# Patient Record
Sex: FEMALE | Race: WHITE | NOT HISPANIC OR LATINO | Employment: UNEMPLOYED | URBAN - METROPOLITAN AREA
[De-identification: names, ages, dates, MRNs, and addresses within clinical notes are randomized per-mention and may not be internally consistent; named-entity substitution may affect disease eponyms.]

---

## 2017-08-10 PROBLEM — O35.03X0 CHOROID PLEXUS CYST OF FETUS AFFECTING MANAGEMENT OF MOTHER IN SINGLETON PREGNANCY, ANTEPARTUM: Status: ACTIVE | Noted: 2017-08-10

## 2017-09-26 PROBLEM — O26.899 ABDOMINAL PAIN AFFECTING PREGNANCY: Status: ACTIVE | Noted: 2017-09-26

## 2017-09-26 PROBLEM — R10.9 ABDOMINAL PAIN AFFECTING PREGNANCY: Status: ACTIVE | Noted: 2017-09-26

## 2018-10-23 ENCOUNTER — OFFICE VISIT (OUTPATIENT)
Dept: URGENT CARE | Facility: CLINIC | Age: 33
End: 2018-10-23
Payer: MEDICAID

## 2018-10-23 VITALS
WEIGHT: 172 LBS | HEIGHT: 65 IN | DIASTOLIC BLOOD PRESSURE: 94 MMHG | BODY MASS INDEX: 28.66 KG/M2 | HEART RATE: 105 BPM | SYSTOLIC BLOOD PRESSURE: 135 MMHG | TEMPERATURE: 98 F | RESPIRATION RATE: 18 BRPM | OXYGEN SATURATION: 95 %

## 2018-10-23 DIAGNOSIS — J32.9 SINUSITIS, UNSPECIFIED CHRONICITY, UNSPECIFIED LOCATION: ICD-10-CM

## 2018-10-23 DIAGNOSIS — R06.2 WHEEZING: ICD-10-CM

## 2018-10-23 DIAGNOSIS — J40 BRONCHITIS: Primary | ICD-10-CM

## 2018-10-23 PROCEDURE — 99214 OFFICE O/P EST MOD 30 MIN: CPT | Mod: S$GLB,,, | Performed by: EMERGENCY MEDICINE

## 2018-10-23 RX ORDER — ALBUTEROL SULFATE 90 UG/1
2 AEROSOL, METERED RESPIRATORY (INHALATION) EVERY 6 HOURS PRN
Qty: 18 G | Refills: 0 | Status: SHIPPED | OUTPATIENT
Start: 2018-10-23 | End: 2019-10-23

## 2018-10-23 RX ORDER — AZITHROMYCIN 250 MG/1
TABLET, FILM COATED ORAL
Qty: 6 TABLET | Refills: 0 | Status: SHIPPED | OUTPATIENT
Start: 2018-10-23 | End: 2022-03-28

## 2018-10-23 RX ORDER — DEXAMETHASONE SODIUM PHOSPHATE 4 MG/ML
8 INJECTION, SOLUTION INTRA-ARTICULAR; INTRALESIONAL; INTRAMUSCULAR; INTRAVENOUS; SOFT TISSUE ONCE
Status: COMPLETED | OUTPATIENT
Start: 2018-10-23 | End: 2018-10-23

## 2018-10-23 RX ORDER — PREDNISONE 20 MG/1
40 TABLET ORAL DAILY
Qty: 10 TABLET | Refills: 0 | Status: SHIPPED | OUTPATIENT
Start: 2018-10-23 | End: 2018-10-28

## 2018-10-23 RX ORDER — ALBUTEROL SULFATE 0.83 MG/ML
2.5 SOLUTION RESPIRATORY (INHALATION) EVERY 6 HOURS PRN
Qty: 1 BOX | Refills: 0 | Status: SHIPPED | OUTPATIENT
Start: 2018-10-23 | End: 2019-10-23

## 2018-10-23 RX ORDER — BENZONATATE 100 MG/1
100 CAPSULE ORAL 3 TIMES DAILY PRN
Qty: 30 CAPSULE | Refills: 1 | Status: SHIPPED | OUTPATIENT
Start: 2018-10-23 | End: 2019-10-23

## 2018-10-23 RX ADMIN — DEXAMETHASONE SODIUM PHOSPHATE 8 MG: 4 INJECTION, SOLUTION INTRA-ARTICULAR; INTRALESIONAL; INTRAMUSCULAR; INTRAVENOUS; SOFT TISSUE at 08:10

## 2018-10-24 NOTE — PATIENT INSTRUCTIONS
Bronchitis with Wheezing (Viral or Bacterial: Adult)    Bronchitis is an infection of the air passages. It often occurs during a cold and is usually caused by a virus. Symptoms include cough with mucus (phlegm) and low-grade fever. This illness is contagious during the first few days and is spread through the air by coughing and sneezing, or by direct contact (touching the sick person and then touching your own eyes, nose, or mouth).  If there is a lot of inflammation, air flow is restricted. The air passages may also go into spasm, especially if you have asthma. This causes wheezing and difficulty breathing even in people who do not have asthma.  Bronchitis usually lasts 7 to 14 days. The wheezing should improve with treatment during the first week. An inhaler is often prescribed to relax the air passages and stop wheezing. Antibiotics will be prescribed if your doctor thinks there is also a secondary bacterial infection.  Home care  · If symptoms are severe, rest at home for the first 2 to 3 days. When you go back to your usual activities, don't let yourself get too tired.  · Do not smoke. Also avoid being exposed to secondhand smoke.  · You may use over-the-counter medicine to control fever or pain, unless another medicine was prescribed. Note: If you have chronic liver or kidney disease or have ever had a stomach ulcer or gastrointestinal bleeding, talk with your healthcare provider before using these medicines. Also talk to your provider if you are taking medicine to prevent blood clots.) Aspirin should never be given to anyone younger than 18 years of age who is ill with a viral infection or fever. It may cause severe liver or brain damage.  · Your appetite may be poor, so a light diet is fine. Avoid dehydration by drinking 6 to 8 glasses of fluids per day (such as water, soft drinks, sports drinks, juices, tea, or soup). Extra fluids will help loosen secretions in the nose and lungs.  · Over-the-counter  cough, cold, and sore-throat medicines will not shorten the length of the illness, but they may be helpful to reduce symptoms. (Note: Do not use decongestants if you have high blood pressure.)  · If you were given an inhaler, use it exactly as directed. If you need to use it more often than prescribed, your condition may be worsening. If this happens, contact your healthcare provider.  · If prescribed, finish all antibiotic medicine, even if you are feeling better after only a few days.  Follow-up care  Follow up with your healthcare provider, or as advised. If you had an X-ray or ECG (electrocardiogram), a specialist will review it. You will be notified of any new findings that may affect your care.  Note: If you are age 65 or older, or if you have a chronic lung disease or condition that affects your immune system, or you smoke, talk to your healthcare provider about having a pneumococcal vaccinations and a yearly influenza vaccination (flu shot).  When to seek medical advice  Call your healthcare provider right away if any of these occur:  · Fever of 100.4°F (38°C) or higher  · Coughing up increasing amounts of colored sputum  · Weakness, drowsiness, headache, facial pain, ear pain, or a stiff neck  Call 911, or get immediate medical care  Contact emergency services right away if any of these occur.  · Coughing up blood  · Worsening weakness, drowsiness, headache, or stiff neck  · Increased wheezing not helped with medication, shortness of breath, or pain with breathing  Date Last Reviewed: 9/13/2015  © 4903-6930 [employer]. [address] [place] [address], [place] [address]. All rights reserved. This information is not intended as a substitute for professional medical care. Always follow your healthcare professional's instructions.

## 2018-10-24 NOTE — PROGRESS NOTES
"Subjective:       Patient ID: Bhavna Heart is a 33 y.o. female.    Vitals:  height is 5' 5" (1.651 m) and weight is 78 kg (172 lb). Her oral temperature is 98 °F (36.7 °C). Her blood pressure is 135/94 (abnormal) and her pulse is 105. Her respiration is 18 and oxygen saturation is 95%.     Chief Complaint: Cough    Pt presents with productive cough with green sputum production and nasal congestion x 2 days. She denies f/c/n/v. She is having int wheezing and has done several resp tx at home for relief.       Cough   This is a new problem. The current episode started yesterday. Associated symptoms include wheezing. Pertinent negatives include no chest pain, chills, ear pain, eye redness, fever, headaches, myalgias, sore throat or shortness of breath. Treatments tried: albuterol neb. The treatment provided mild relief.     Review of Systems   Constitution: Negative for chills, fever and malaise/fatigue.   HENT: Positive for congestion. Negative for ear pain, hoarse voice and sore throat.    Eyes: Negative for discharge and redness.   Cardiovascular: Negative for chest pain, dyspnea on exertion and leg swelling.   Respiratory: Positive for cough and wheezing. Negative for shortness of breath and sputum production.    Musculoskeletal: Negative for myalgias.   Gastrointestinal: Negative for abdominal pain and nausea.   Neurological: Negative for headaches.       Objective:      Physical Exam   Constitutional: She is oriented to person, place, and time. She appears well-developed and well-nourished. She is cooperative.  Non-toxic appearance. She does not appear ill. No distress.   HENT:   Head: Normocephalic and atraumatic.   Right Ear: Hearing, tympanic membrane, external ear and ear canal normal.   Left Ear: Hearing, tympanic membrane, external ear and ear canal normal.   Nose: Nose normal. No mucosal edema, rhinorrhea or nasal deformity. No epistaxis. Right sinus exhibits no maxillary sinus tenderness and " no frontal sinus tenderness. Left sinus exhibits no maxillary sinus tenderness and no frontal sinus tenderness.   Mouth/Throat: Uvula is midline and mucous membranes are normal. No trismus in the jaw. Normal dentition. No uvula swelling. Posterior oropharyngeal erythema present.   Eyes: Conjunctivae and lids are normal. No scleral icterus.   Sclera clear bilat   Neck: Trachea normal, full passive range of motion without pain and phonation normal. Neck supple.   Cardiovascular: Normal rate, regular rhythm, normal heart sounds, intact distal pulses and normal pulses.   Pulmonary/Chest: Effort normal. No respiratory distress. She has wheezes.   Faint scattered exp wheezing   Abdominal: Soft. Normal appearance and bowel sounds are normal. She exhibits no distension. There is no tenderness.   Musculoskeletal: Normal range of motion. She exhibits no edema or deformity.   Neurological: She is alert and oriented to person, place, and time. She exhibits normal muscle tone. Coordination normal.   Skin: Skin is warm, dry and intact. She is not diaphoretic. No pallor.   Psychiatric: She has a normal mood and affect. Her speech is normal and behavior is normal. Judgment and thought content normal. Cognition and memory are normal.   Nursing note and vitals reviewed.      Assessment:       1. Bronchitis    2. Wheezing    3. Sinusitis, unspecified chronicity, unspecified location        Plan:         Bronchitis    Wheezing    Sinusitis, unspecified chronicity, unspecified location    Other orders  -     dexamethasone injection 8 mg  -     azithromycin (Z-KARLA) 250 MG tablet; Take 2 tablets by mouth on day 1; Take 1 tablet by mouth on days 2-5  Dispense: 6 tablet; Refill: 0  -     predniSONE (DELTASONE) 20 MG tablet; Take 2 tablets (40 mg total) by mouth once daily. for 5 days  Dispense: 10 tablet; Refill: 0  -     albuterol (PROVENTIL) 2.5 mg /3 mL (0.083 %) nebulizer solution; Take 3 mLs (2.5 mg total) by nebulization every 6 (six)  hours as needed for Wheezing. Rescue  Dispense: 1 Box; Refill: 0  -     albuterol (PROVENTIL HFA) 90 mcg/actuation inhaler; Inhale 2 puffs into the lungs every 6 (six) hours as needed for Wheezing. Rescue  Dispense: 18 g; Refill: 0  -     benzonatate (TESSALON PERLES) 100 MG capsule; Take 1 capsule (100 mg total) by mouth 3 (three) times daily as needed for Cough.  Dispense: 30 capsule; Refill: 1

## 2019-05-31 ENCOUNTER — CLINICAL SUPPORT (OUTPATIENT)
Dept: URGENT CARE | Facility: CLINIC | Age: 34
End: 2019-05-31
Payer: MEDICAID

## 2019-05-31 VITALS
SYSTOLIC BLOOD PRESSURE: 118 MMHG | HEART RATE: 81 BPM | BODY MASS INDEX: 29.4 KG/M2 | TEMPERATURE: 99 F | WEIGHT: 172.19 LBS | HEIGHT: 64 IN | DIASTOLIC BLOOD PRESSURE: 84 MMHG | OXYGEN SATURATION: 97 % | RESPIRATION RATE: 16 BRPM

## 2019-05-31 DIAGNOSIS — H66.001 ACUTE SUPPURATIVE OTITIS MEDIA OF RIGHT EAR WITHOUT SPONTANEOUS RUPTURE OF TYMPANIC MEMBRANE, RECURRENCE NOT SPECIFIED: Primary | ICD-10-CM

## 2019-05-31 PROCEDURE — 99214 OFFICE O/P EST MOD 30 MIN: CPT | Mod: S$GLB,,, | Performed by: NURSE PRACTITIONER

## 2019-05-31 PROCEDURE — 99214 PR OFFICE/OUTPT VISIT, EST, LEVL IV, 30-39 MIN: ICD-10-PCS | Mod: S$GLB,,, | Performed by: NURSE PRACTITIONER

## 2019-05-31 RX ORDER — AMOXICILLIN AND CLAVULANATE POTASSIUM 875; 125 MG/1; MG/1
1 TABLET, FILM COATED ORAL 2 TIMES DAILY
Qty: 20 TABLET | Refills: 0 | Status: SHIPPED | OUTPATIENT
Start: 2019-05-31 | End: 2019-06-10

## 2019-05-31 RX ORDER — BUPROPION HYDROCHLORIDE 150 MG/1
150 TABLET, EXTENDED RELEASE ORAL 2 TIMES DAILY
COMMUNITY
End: 2022-03-28

## 2019-05-31 NOTE — PATIENT INSTRUCTIONS
Middle Ear Infection (Adult)  You have an infection of the middle ear, the space behind the eardrum. This is also called acute otitis media (AOM). Sometimes it is caused by the common cold. This is because congestion can block the internal passage (eustachian tube) that drains fluid from the middle ear. When the middle ear fills with fluid, bacteria can grow there and cause an infection. Oral antibiotics are used to treat this illness, not ear drops. Symptoms usually start to improve within 1 to 2 days of treatment.    Home care  The following are general care guidelines:  · Finish all of the antibiotic medicine given, even though you may feel better after the first few days.  · You may use over-the-counter medicine, such as acetaminophen or ibuprofen, to control pain and fever, unless something else was prescribed. If you have chronic liver or kidney disease or have ever had a stomach ulcer or gastrointestinal bleeding, talk with your healthcare provider before using these medicines. Do not give aspirin to anyone under 18 years of age who has a fever. It may cause severe illness or death.  Follow-up care  Follow up with your healthcare provider, or as advised, in 2 weeks if all symptoms have not gotten better, or if hearing doesn't go back to normal within 1 month.  When to seek medical advice  Call your healthcare provider right away if any of these occur:  · Ear pain gets worse or does not improve after 3 days of treatment  · Unusual drowsiness or confusion  · Neck pain, stiff neck, or headache  · Fluid or blood draining from the ear canal  · Fever of 100.4°F (38°C) or as advised   · Seizure  Date Last Reviewed: 6/1/2016  © 1464-0422 [employer]. [address] [place] [address], [place] [address]. All rights reserved. This information is not intended as a substitute for professional medical care. Always follow your healthcare professional's instructions.

## 2019-05-31 NOTE — PROGRESS NOTES
"Subjective:       Patient ID: Bhavna Heart is a 34 y.o. female.    Vitals:  height is 5' 4" (1.626 m) and weight is 78.1 kg (172 lb 3.2 oz). Her temperature is 99 °F (37.2 °C). Her blood pressure is 118/84 and her pulse is 81. Her respiration is 16 and oxygen saturation is 97%.     Chief Complaint: Sinus Problem    Sinus Problem   This is a new problem. The current episode started in the past 7 days. Associated symptoms include congestion, ear pain, a hoarse voice, sinus pressure and a sore throat. Pertinent negatives include no chills, coughing, headaches or shortness of breath. (Ear aches  )       Constitution: Negative for chills, fatigue and fever.   HENT: Positive for ear pain, congestion, sinus pressure and sore throat.    Neck: Negative for painful lymph nodes.   Cardiovascular: Negative for chest pain and leg swelling.   Eyes: Negative for double vision and blurred vision.   Respiratory: Negative for cough and shortness of breath.    Gastrointestinal: Negative for nausea, vomiting and diarrhea.   Genitourinary: Negative for dysuria, frequency, urgency and history of kidney stones.   Musculoskeletal: Negative for joint pain, joint swelling, muscle cramps and muscle ache.   Skin: Negative for color change, pale, rash and bruising.   Allergic/Immunologic: Negative for seasonal allergies.   Neurological: Negative for dizziness, history of vertigo, light-headedness, passing out and headaches.   Hematologic/Lymphatic: Negative for swollen lymph nodes.   Psychiatric/Behavioral: Negative for nervous/anxious, sleep disturbance and depression. The patient is not nervous/anxious.        Objective:      Physical Exam   Constitutional: She is oriented to person, place, and time. Vital signs are normal. She appears well-developed and well-nourished. She is cooperative.   HENT:   Head: Normocephalic.   Right Ear: Hearing, external ear and ear canal normal. Tympanic membrane is erythematous and bulging. A " middle ear effusion is present.   Left Ear: Hearing, external ear and ear canal normal. A middle ear effusion is present.   Nose: Mucosal edema and rhinorrhea present. Right sinus exhibits maxillary sinus tenderness. Left sinus exhibits maxillary sinus tenderness.   Mouth/Throat: Uvula is midline and mucous membranes are normal. Posterior oropharyngeal erythema present.   Eyes: Pupils are equal, round, and reactive to light. Conjunctivae, EOM and lids are normal.   Neck: Trachea normal, normal range of motion, full passive range of motion without pain and phonation normal. Neck supple.   Cardiovascular: Normal rate, regular rhythm, normal heart sounds, intact distal pulses and normal pulses.   Pulmonary/Chest: Effort normal and breath sounds normal.   Abdominal: Soft. Normal appearance, normal aorta and bowel sounds are normal. There is no tenderness.   Musculoskeletal: Normal range of motion.   Neurological: She is alert and oriented to person, place, and time. She has normal strength. GCS eye subscore is 4. GCS verbal subscore is 5. GCS motor subscore is 6.   Skin: Skin is warm, dry and intact. Capillary refill takes less than 2 seconds.   Psychiatric: She has a normal mood and affect. Her speech is normal and behavior is normal. Judgment and thought content normal. Cognition and memory are normal.       Assessment:       1. Acute suppurative otitis media of right ear without spontaneous rupture of tympanic membrane, recurrence not specified        Plan:         Acute suppurative otitis media of right ear without spontaneous rupture of tympanic membrane, recurrence not specified    Other orders  -     amoxicillin-clavulanate 875-125mg (AUGMENTIN) 875-125 mg per tablet; Take 1 tablet by mouth 2 (two) times daily. for 10 days  Dispense: 20 tablet; Refill: 0

## 2021-01-04 ENCOUNTER — OFFICE VISIT (OUTPATIENT)
Dept: URGENT CARE | Facility: CLINIC | Age: 36
End: 2021-01-04
Payer: MEDICAID

## 2021-01-04 VITALS
DIASTOLIC BLOOD PRESSURE: 93 MMHG | OXYGEN SATURATION: 93 % | HEIGHT: 65 IN | RESPIRATION RATE: 24 BRPM | WEIGHT: 189.38 LBS | SYSTOLIC BLOOD PRESSURE: 134 MMHG | HEART RATE: 101 BPM | BODY MASS INDEX: 31.55 KG/M2 | TEMPERATURE: 98 F

## 2021-01-04 DIAGNOSIS — J40 BRONCHITIS: Primary | ICD-10-CM

## 2021-01-04 DIAGNOSIS — R06.2 WHEEZING: ICD-10-CM

## 2021-01-04 LAB
CTP QC/QA: YES
SARS-COV-2 RDRP RESP QL NAA+PROBE: NEGATIVE

## 2021-01-04 PROCEDURE — 99214 PR OFFICE/OUTPT VISIT, EST, LEVL IV, 30-39 MIN: ICD-10-PCS | Mod: 25,S$GLB,, | Performed by: NURSE PRACTITIONER

## 2021-01-04 PROCEDURE — 87635 PR SARS-COV-2 COVID-19 AMPLIFIED PROBE: ICD-10-PCS | Mod: QW,S$GLB,, | Performed by: NURSE PRACTITIONER

## 2021-01-04 PROCEDURE — 94640 PR INHAL RX, AIRWAY OBST/DX SPUTUM INDUCT: ICD-10-PCS | Mod: S$GLB,,, | Performed by: NURSE PRACTITIONER

## 2021-01-04 PROCEDURE — 99214 OFFICE O/P EST MOD 30 MIN: CPT | Mod: 25,S$GLB,, | Performed by: NURSE PRACTITIONER

## 2021-01-04 PROCEDURE — 94640 AIRWAY INHALATION TREATMENT: CPT | Mod: S$GLB,,, | Performed by: NURSE PRACTITIONER

## 2021-01-04 PROCEDURE — 87635 SARS-COV-2 COVID-19 AMP PRB: CPT | Mod: QW,S$GLB,, | Performed by: NURSE PRACTITIONER

## 2021-01-04 RX ORDER — ALBUTEROL SULFATE 0.83 MG/ML
2.5 SOLUTION RESPIRATORY (INHALATION)
Status: COMPLETED | OUTPATIENT
Start: 2021-01-04 | End: 2021-01-04

## 2021-01-04 RX ORDER — ALBUTEROL SULFATE 90 UG/1
2 AEROSOL, METERED RESPIRATORY (INHALATION) EVERY 6 HOURS PRN
Qty: 18 G | Refills: 0 | Status: SHIPPED | OUTPATIENT
Start: 2021-01-04 | End: 2022-01-04

## 2021-01-04 RX ORDER — ALBUTEROL SULFATE 0.83 MG/ML
2.5 SOLUTION RESPIRATORY (INHALATION) EVERY 6 HOURS PRN
Qty: 1 BOX | Refills: 0 | Status: SHIPPED | OUTPATIENT
Start: 2021-01-04 | End: 2022-03-28 | Stop reason: SDUPTHER

## 2021-01-04 RX ORDER — DEXAMETHASONE SODIUM PHOSPHATE 4 MG/ML
8 INJECTION, SOLUTION INTRA-ARTICULAR; INTRALESIONAL; INTRAMUSCULAR; INTRAVENOUS; SOFT TISSUE
Status: COMPLETED | OUTPATIENT
Start: 2021-01-04 | End: 2021-01-04

## 2021-01-04 RX ORDER — PREDNISONE 20 MG/1
20 TABLET ORAL 2 TIMES DAILY
Qty: 10 TABLET | Refills: 0 | Status: SHIPPED | OUTPATIENT
Start: 2021-01-04 | End: 2021-01-09

## 2021-01-04 RX ORDER — IPRATROPIUM BROMIDE 0.5 MG/2.5ML
0.5 SOLUTION RESPIRATORY (INHALATION)
Status: COMPLETED | OUTPATIENT
Start: 2021-01-04 | End: 2021-01-04

## 2021-01-04 RX ADMIN — DEXAMETHASONE SODIUM PHOSPHATE 8 MG: 4 INJECTION, SOLUTION INTRA-ARTICULAR; INTRALESIONAL; INTRAMUSCULAR; INTRAVENOUS; SOFT TISSUE at 03:01

## 2021-01-04 RX ADMIN — ALBUTEROL SULFATE 2.5 MG: 0.83 SOLUTION RESPIRATORY (INHALATION) at 03:01

## 2021-01-04 RX ADMIN — IPRATROPIUM BROMIDE 0.5 MG: 0.5 SOLUTION RESPIRATORY (INHALATION) at 03:01

## 2022-03-28 ENCOUNTER — OFFICE VISIT (OUTPATIENT)
Dept: FAMILY MEDICINE | Facility: CLINIC | Age: 37
End: 2022-03-28
Payer: MEDICAID

## 2022-03-28 VITALS
WEIGHT: 179 LBS | TEMPERATURE: 99 F | DIASTOLIC BLOOD PRESSURE: 84 MMHG | HEIGHT: 65 IN | HEART RATE: 82 BPM | SYSTOLIC BLOOD PRESSURE: 128 MMHG | BODY MASS INDEX: 29.82 KG/M2 | OXYGEN SATURATION: 98 %

## 2022-03-28 DIAGNOSIS — Z00.00 ANNUAL PHYSICAL EXAM: Primary | ICD-10-CM

## 2022-03-28 DIAGNOSIS — Z01.419 ENCOUNTER FOR WELL WOMAN EXAM WITH ROUTINE GYNECOLOGICAL EXAM: ICD-10-CM

## 2022-03-28 DIAGNOSIS — J30.1 ALLERGIC RHINITIS DUE TO POLLEN, UNSPECIFIED SEASONALITY: Primary | ICD-10-CM

## 2022-03-28 DIAGNOSIS — E66.09 CLASS 1 OBESITY DUE TO EXCESS CALORIES WITH SERIOUS COMORBIDITY AND BODY MASS INDEX (BMI) OF 30.0 TO 30.9 IN ADULT: ICD-10-CM

## 2022-03-28 DIAGNOSIS — Z13.1 DIABETES MELLITUS SCREENING: ICD-10-CM

## 2022-03-28 DIAGNOSIS — Z13.29 THYROID DISORDER SCREEN: ICD-10-CM

## 2022-03-28 DIAGNOSIS — J45.20 MILD INTERMITTENT REACTIVE AIRWAY DISEASE WITHOUT COMPLICATION: ICD-10-CM

## 2022-03-28 DIAGNOSIS — Z11.59 NEED FOR HEPATITIS C SCREENING TEST: ICD-10-CM

## 2022-03-28 DIAGNOSIS — Z13.220 LIPID SCREENING: ICD-10-CM

## 2022-03-28 DIAGNOSIS — Z11.4 ENCOUNTER FOR SCREENING FOR HIV: ICD-10-CM

## 2022-03-28 PROCEDURE — 3079F DIAST BP 80-89 MM HG: CPT | Mod: ,,, | Performed by: NURSE PRACTITIONER

## 2022-03-28 PROCEDURE — 1160F PR REVIEW ALL MEDS BY PRESCRIBER/CLIN PHARMACIST DOCUMENTED: ICD-10-PCS | Mod: ,,, | Performed by: NURSE PRACTITIONER

## 2022-03-28 PROCEDURE — 99385 PREV VISIT NEW AGE 18-39: CPT | Mod: S$PBB,,, | Performed by: NURSE PRACTITIONER

## 2022-03-28 PROCEDURE — 3008F PR BODY MASS INDEX (BMI) DOCUMENTED: ICD-10-PCS | Mod: ,,, | Performed by: NURSE PRACTITIONER

## 2022-03-28 PROCEDURE — 3074F PR MOST RECENT SYSTOLIC BLOOD PRESSURE < 130 MM HG: ICD-10-PCS | Mod: ,,, | Performed by: NURSE PRACTITIONER

## 2022-03-28 PROCEDURE — 99205 OFFICE O/P NEW HI 60 MIN: CPT | Performed by: NURSE PRACTITIONER

## 2022-03-28 PROCEDURE — 1160F RVW MEDS BY RX/DR IN RCRD: CPT | Mod: ,,, | Performed by: NURSE PRACTITIONER

## 2022-03-28 PROCEDURE — 3074F SYST BP LT 130 MM HG: CPT | Mod: ,,, | Performed by: NURSE PRACTITIONER

## 2022-03-28 PROCEDURE — 99385 PR PREVENTIVE VISIT,NEW,18-39: ICD-10-PCS | Mod: S$PBB,,, | Performed by: NURSE PRACTITIONER

## 2022-03-28 PROCEDURE — 3079F PR MOST RECENT DIASTOLIC BLOOD PRESSURE 80-89 MM HG: ICD-10-PCS | Mod: ,,, | Performed by: NURSE PRACTITIONER

## 2022-03-28 PROCEDURE — 3008F BODY MASS INDEX DOCD: CPT | Mod: ,,, | Performed by: NURSE PRACTITIONER

## 2022-03-28 RX ORDER — FEXOFENADINE HCL AND PSEUDOEPHEDRINE HCI 180; 240 MG/1; MG/1
1 TABLET, EXTENDED RELEASE ORAL DAILY
COMMUNITY
End: 2023-05-23

## 2022-03-28 RX ORDER — ALBUTEROL SULFATE 90 UG/1
2 AEROSOL, METERED RESPIRATORY (INHALATION) EVERY 6 HOURS PRN
Qty: 18 G | Refills: 1 | Status: SHIPPED | OUTPATIENT
Start: 2022-03-28 | End: 2022-05-19 | Stop reason: SDUPTHER

## 2022-03-28 RX ORDER — ALBUTEROL SULFATE 0.83 MG/ML
2.5 SOLUTION RESPIRATORY (INHALATION) EVERY 6 HOURS PRN
Qty: 1 EACH | Refills: 2 | Status: SHIPPED | OUTPATIENT
Start: 2022-03-28 | End: 2022-05-19 | Stop reason: SDUPTHER

## 2022-03-28 RX ORDER — FLUTICASONE PROPIONATE 50 MCG
1 SPRAY, SUSPENSION (ML) NASAL 2 TIMES DAILY
Qty: 16 G | Refills: 2 | Status: SHIPPED | OUTPATIENT
Start: 2022-03-28 | End: 2022-08-25

## 2022-03-28 RX ORDER — AZELASTINE 1 MG/ML
1 SPRAY, METERED NASAL 2 TIMES DAILY
Qty: 30 ML | Refills: 2 | Status: SHIPPED | OUTPATIENT
Start: 2022-03-28 | End: 2022-07-07

## 2022-03-28 NOTE — PROGRESS NOTES
HPI: Bhavna Heart  is a 36 y.o. female who presents for annual physical .  No complaints at this time.     Review of Systems   Constitutional: Negative for activity change, appetite change and fever.   HENT: Negative for congestion, ear discharge, ear pain, sore throat, trouble swallowing and voice change.    Eyes: Negative for photophobia, pain, discharge and visual disturbance.   Respiratory: Negative for cough, chest tightness and shortness of breath.    Cardiovascular: Negative for chest pain and palpitations.   Gastrointestinal: Negative for abdominal pain, nausea and vomiting.   Endocrine: Negative for cold intolerance and heat intolerance.   Genitourinary: Negative for difficulty urinating and dysuria.   Musculoskeletal: Negative for arthralgias and gait problem.   Skin: Negative for rash.   Allergic/Immunologic: Negative for immunocompromised state.   Neurological: Negative for speech difficulty and headaches.   Psychiatric/Behavioral: Negative for confusion, self-injury and suicidal ideas.     Review of patient's allergies indicates:   Allergen Reactions    Doxycycline Nausea Only     History reviewed. No pertinent past medical history.  History reviewed. No pertinent surgical history.  Family History   Problem Relation Age of Onset    No Known Problems Mother     Asthma Father     Asthma Maternal Aunt     Cancer Paternal Uncle     Diabetes Maternal Grandmother     Thyroid disease Maternal Grandmother     Cancer Maternal Grandfather     Kidney disease Maternal Grandfather     Heart disease Paternal Grandfather     Stroke Paternal Grandfather      Social History     Tobacco Use    Smoking status: Former Smoker     Packs/day: 0.50     Quit date: 2022     Years since quittin.1    Smokeless tobacco: Never Used   Substance Use Topics    Alcohol use: No    Drug use: No      The patient has no Health Maintenance topics of status Not Due    There is no immunization history  on file for this patient.  OBJECTIVE:      Vitals:    03/28/22 0934   BP: 128/84   Pulse: 82   Temp: 99.4 °F (37.4 °C)     Physical Exam  Vitals and nursing note reviewed.   Constitutional:       General: She is not in acute distress.     Appearance: Normal appearance. She is well-developed. She is obese.   HENT:      Head: Normocephalic and atraumatic.      Right Ear: Tympanic membrane, ear canal and external ear normal.      Left Ear: Tympanic membrane, ear canal and external ear normal.      Nose: Nose normal.      Mouth/Throat:      Mouth: Mucous membranes are moist.   Eyes:      General: Lids are normal. Lids are everted, no foreign bodies appreciated.      Conjunctiva/sclera: Conjunctivae normal.      Pupils: Pupils are equal, round, and reactive to light.      Right eye: Pupil is round and reactive.      Left eye: Pupil is round and reactive.   Neck:      Trachea: Trachea normal.   Cardiovascular:      Rate and Rhythm: Normal rate and regular rhythm.      Pulses: Normal pulses.      Heart sounds: Normal heart sounds, S1 normal and S2 normal.   Pulmonary:      Effort: Pulmonary effort is normal.      Breath sounds: Normal breath sounds.   Abdominal:      General: Abdomen is flat. Bowel sounds are normal.      Palpations: Abdomen is soft. Abdomen is not rigid.      Tenderness: There is no guarding.   Musculoskeletal:         General: Normal range of motion.      Cervical back: Normal range of motion and neck supple. No muscular tenderness.   Lymphadenopathy:      Cervical: No cervical adenopathy.   Skin:     General: Skin is warm and dry.      Capillary Refill: Capillary refill takes less than 2 seconds.   Neurological:      General: No focal deficit present.      Mental Status: She is alert and oriented to person, place, and time. Mental status is at baseline.   Psychiatric:         Mood and Affect: Mood normal.         Behavior: Behavior normal. Behavior is cooperative.         Thought Content: Thought  content normal.         Judgment: Judgment normal.        Assessment:       1. Annual physical exam    2. Encounter for well woman exam with routine gynecological exam    3. Diabetes mellitus screening    4. Encounter for screening for HIV    5. Need for hepatitis C screening test    6. Thyroid disorder screen    7. Lipid screening    8. Class 1 obesity due to excess calories with serious comorbidity and body mass index (BMI) of 30.0 to 30.9 in adult        Plan:       Annual physical exam  Completed.    Encounter for well woman exam with routine gynecological exam  -     Cancel: Ambulatory referral/consult to Obstetrics / Gynecology; Future; Expected date: 04/04/2022  -     Ambulatory referral/consult to Obstetrics / Gynecology; Future; Expected date: 04/04/2022    Diabetes mellitus screening  -     Comprehensive Metabolic Panel; Future; Expected date: 03/28/2022    Encounter for screening for HIV  -     HIV 1/2 Ag/Ab (4th Gen); Future; Expected date: 03/28/2022    Need for hepatitis C screening test  -     Hepatitis C Antibody; Future; Expected date: 03/28/2022    Thyroid disorder screen  -     TSH; Future; Expected date: 03/28/2022    Lipid screening  -     Lipid Panel; Future; Expected date: 03/28/2022    Class 1 obesity due to excess calories with serious comorbidity and body mass index (BMI) of 30.0 to 30.9 in adult  The patient is asked to make an attempt to improve diet and exercise patterns to aid in medical management of this problem.        Patient counseled on age appropriate medical preventative services, age appropriate cancer screenings, nutrition, healthy diet, consistent exercise regimen and maintaining an active lifestyle.      Counseled on age appropriate vaccines and discussed upcoming health care needs based on age/gender.  Spent time with patient counseling on need for a good patient/doctor relationship moving forward.  Discussed use of common OTC medications and supplements.  Discussed common  dietary aids and use of caffeine and the need for good sleep hygiene and stress management.    Follow up in about 4 weeks (around 4/25/2022) for wheezing, lab review, allergies.      3/28/2022 KOFI Ba, FNP-C

## 2022-05-19 ENCOUNTER — OFFICE VISIT (OUTPATIENT)
Dept: FAMILY MEDICINE | Facility: CLINIC | Age: 37
End: 2022-05-19
Payer: MEDICAID

## 2022-05-19 VITALS
OXYGEN SATURATION: 97 % | SYSTOLIC BLOOD PRESSURE: 130 MMHG | WEIGHT: 184 LBS | HEIGHT: 65 IN | BODY MASS INDEX: 30.66 KG/M2 | DIASTOLIC BLOOD PRESSURE: 80 MMHG | TEMPERATURE: 99 F | HEART RATE: 97 BPM

## 2022-05-19 DIAGNOSIS — J45.20 MILD INTERMITTENT REACTIVE AIRWAY DISEASE WITHOUT COMPLICATION: Primary | ICD-10-CM

## 2022-05-19 DIAGNOSIS — J06.9 UPPER RESPIRATORY TRACT INFECTION, UNSPECIFIED TYPE: ICD-10-CM

## 2022-05-19 PROCEDURE — 3075F SYST BP GE 130 - 139MM HG: CPT | Mod: CPTII,,, | Performed by: NURSE PRACTITIONER

## 2022-05-19 PROCEDURE — 3008F BODY MASS INDEX DOCD: CPT | Mod: CPTII,,, | Performed by: NURSE PRACTITIONER

## 2022-05-19 PROCEDURE — 99213 OFFICE O/P EST LOW 20 MIN: CPT | Mod: S$PBB,,, | Performed by: NURSE PRACTITIONER

## 2022-05-19 PROCEDURE — 1159F PR MEDICATION LIST DOCUMENTED IN MEDICAL RECORD: ICD-10-PCS | Mod: CPTII,,, | Performed by: NURSE PRACTITIONER

## 2022-05-19 PROCEDURE — 3075F PR MOST RECENT SYSTOLIC BLOOD PRESS GE 130-139MM HG: ICD-10-PCS | Mod: CPTII,,, | Performed by: NURSE PRACTITIONER

## 2022-05-19 PROCEDURE — 99214 OFFICE O/P EST MOD 30 MIN: CPT | Performed by: NURSE PRACTITIONER

## 2022-05-19 PROCEDURE — 99213 PR OFFICE/OUTPT VISIT, EST, LEVL III, 20-29 MIN: ICD-10-PCS | Mod: S$PBB,,, | Performed by: NURSE PRACTITIONER

## 2022-05-19 PROCEDURE — 1160F PR REVIEW ALL MEDS BY PRESCRIBER/CLIN PHARMACIST DOCUMENTED: ICD-10-PCS | Mod: CPTII,,, | Performed by: NURSE PRACTITIONER

## 2022-05-19 PROCEDURE — 1160F RVW MEDS BY RX/DR IN RCRD: CPT | Mod: CPTII,,, | Performed by: NURSE PRACTITIONER

## 2022-05-19 PROCEDURE — 3079F PR MOST RECENT DIASTOLIC BLOOD PRESSURE 80-89 MM HG: ICD-10-PCS | Mod: CPTII,,, | Performed by: NURSE PRACTITIONER

## 2022-05-19 PROCEDURE — 3079F DIAST BP 80-89 MM HG: CPT | Mod: CPTII,,, | Performed by: NURSE PRACTITIONER

## 2022-05-19 PROCEDURE — 3008F PR BODY MASS INDEX (BMI) DOCUMENTED: ICD-10-PCS | Mod: CPTII,,, | Performed by: NURSE PRACTITIONER

## 2022-05-19 PROCEDURE — 1159F MED LIST DOCD IN RCRD: CPT | Mod: CPTII,,, | Performed by: NURSE PRACTITIONER

## 2022-05-19 RX ORDER — FLUTICASONE PROPIONATE AND SALMETEROL 100; 50 UG/1; UG/1
1 POWDER RESPIRATORY (INHALATION) 2 TIMES DAILY
Qty: 60 EACH | Refills: 2 | Status: SHIPPED | OUTPATIENT
Start: 2022-05-19 | End: 2022-06-21

## 2022-05-19 RX ORDER — AZITHROMYCIN 250 MG/1
TABLET, FILM COATED ORAL
Qty: 6 TABLET | Refills: 0 | Status: SHIPPED | OUTPATIENT
Start: 2022-05-19 | End: 2022-05-24

## 2022-05-19 RX ORDER — ALBUTEROL SULFATE 90 UG/1
2 AEROSOL, METERED RESPIRATORY (INHALATION) EVERY 6 HOURS PRN
Qty: 18 G | Refills: 1 | Status: SHIPPED | OUTPATIENT
Start: 2022-05-19 | End: 2022-10-28 | Stop reason: SDUPTHER

## 2022-05-19 RX ORDER — ALBUTEROL SULFATE 0.83 MG/ML
2.5 SOLUTION RESPIRATORY (INHALATION) EVERY 6 HOURS PRN
Qty: 1 EACH | Refills: 2 | Status: SHIPPED | OUTPATIENT
Start: 2022-05-19 | End: 2022-10-28 | Stop reason: SDUPTHER

## 2022-05-19 NOTE — PROGRESS NOTES
Subjective:       Patient ID: Bhavna Heart is a 37 y.o. female.    Chief Complaint: Wheezing, Cough, and Shortness of Breath    Cough  This is a new problem. The current episode started 1 to 4 weeks ago. The problem has been gradually worsening. The problem occurs every few minutes. The cough is productive of sputum. Associated symptoms include ear congestion, ear pain, postnasal drip, shortness of breath and wheezing. Pertinent negatives include no chest pain, fever, headaches, nasal congestion, rash, rhinorrhea, sore throat or weight loss. The symptoms are aggravated by lying down and exercise. She has tried rest, body position changes and a beta-agonist inhaler for the symptoms. The treatment provided mild relief. Her past medical history is significant for asthma and environmental allergies.   Wheezing   This is a new problem. The current episode started in the past 7 days. The problem occurs 2 to 4 times per day. The problem has been gradually worsening. Associated symptoms include coughing, ear pain, shortness of breath and sputum production. Pertinent negatives include no abdominal pain, chest pain, fever, headaches, rash, rhinorrhea, sore throat or vomiting. The symptoms are aggravated by URIs, fumes and any activity. She has tried rest and beta agonist inhalers for the symptoms. The treatment provided moderate relief. Her past medical history is significant for asthma. There is no history of chronic lung disease.     Review of Systems   Constitutional: Negative for activity change, appetite change, fever and weight loss.        Obese   HENT: Positive for ear pain and postnasal drip. Negative for congestion, ear discharge, rhinorrhea, sinus pressure, sore throat, trouble swallowing and voice change.    Eyes: Negative for photophobia, pain, discharge and visual disturbance.   Respiratory: Positive for cough, sputum production, shortness of breath and wheezing. Negative for chest tightness.     Cardiovascular: Negative for chest pain and palpitations.   Gastrointestinal: Negative for abdominal pain, nausea and vomiting.   Endocrine: Negative for cold intolerance and heat intolerance.   Genitourinary: Negative for difficulty urinating and dysuria.   Musculoskeletal: Negative for arthralgias and gait problem.   Skin: Negative for rash.   Allergic/Immunologic: Positive for environmental allergies. Negative for immunocompromised state.   Neurological: Negative for speech difficulty and headaches.   Psychiatric/Behavioral: Negative for confusion, self-injury and suicidal ideas.     History reviewed. No pertinent past medical history. History reviewed. No pertinent surgical history.    Family History   Problem Relation Age of Onset    No Known Problems Mother     Asthma Father     Asthma Maternal Aunt     Cancer Paternal Uncle     Diabetes Maternal Grandmother     Thyroid disease Maternal Grandmother     Cancer Maternal Grandfather     Kidney disease Maternal Grandfather     Heart disease Paternal Grandfather     Stroke Paternal Grandfather        Social History     Socioeconomic History    Marital status: Single   Tobacco Use    Smoking status: Former Smoker     Packs/day: 0.50     Quit date: 2022     Years since quittin.3    Smokeless tobacco: Never Used   Substance and Sexual Activity    Alcohol use: No    Drug use: No    Sexual activity: Yes     Partners: Male       Current Outpatient Medications   Medication Sig Dispense Refill    azelastine (ASTELIN) 137 mcg (0.1 %) nasal spray 1 spray (137 mcg total) by Nasal route 2 (two) times daily. 30 mL 2    fexofenadine-pseudoephedrine (ALLEGRA-D 24) 180-240 mg per 24 hr tablet Take 1 tablet by mouth once daily.      fluticasone propionate (FLONASE) 50 mcg/actuation nasal spray 1 spray (50 mcg total) by Each Nostril route 2 (two) times daily. 16 g 2    albuterol (PROVENTIL) 2.5 mg /3 mL (0.083 %) nebulizer solution Take 3 mLs (2.5 mg  "total) by nebulization every 6 (six) hours as needed for Wheezing. Rescue 1 each 2    albuterol (PROVENTIL/VENTOLIN HFA) 90 mcg/actuation inhaler Inhale 2 puffs into the lungs every 6 (six) hours as needed for Wheezing. 18 g 1    azithromycin (Z-KARLA) 250 MG tablet Take 2 tablets by mouth on day 1; Take 1 tablet by mouth on days 2-5 6 tablet 0    fluticasone-salmeterol diskus inhaler 100-50 mcg Inhale 1 puff into the lungs 2 (two) times daily. Controller 60 each 2     No current facility-administered medications for this visit.       Review of patient's allergies indicates:   Allergen Reactions    Doxycycline Nausea Only     Objective:      Blood pressure 130/80, pulse 97, temperature 98.6 °F (37 °C), height 5' 4.5" (1.638 m), weight 83.5 kg (184 lb), last menstrual period 04/26/2022, SpO2 97 %, unknown if currently breastfeeding. Body mass index is 31.1 kg/m².   Physical Exam  Vitals and nursing note reviewed.   Constitutional:       General: She is not in acute distress.     Appearance: Normal appearance. She is well-developed. She is obese. She is ill-appearing.   HENT:      Head: Normocephalic and atraumatic.      Right Ear: External ear normal. A middle ear effusion is present. Tympanic membrane is erythematous.      Left Ear: External ear normal. A middle ear effusion is present. Tympanic membrane is not erythematous.      Nose: Mucosal edema present.      Mouth/Throat:      Mouth: Mucous membranes are moist.      Pharynx: Uvula midline. Oropharyngeal exudate (clear pnd) present. No pharyngeal swelling or posterior oropharyngeal erythema.   Eyes:      General: Lids are normal. Lids are everted, no foreign bodies appreciated.      Conjunctiva/sclera: Conjunctivae normal.      Pupils: Pupils are equal, round, and reactive to light.      Right eye: Pupil is round and reactive.      Left eye: Pupil is round and reactive.   Neck:      Trachea: Trachea normal.   Cardiovascular:      Rate and Rhythm: Normal rate " and regular rhythm.      Pulses: Normal pulses.      Heart sounds: Normal heart sounds, S1 normal and S2 normal.   Pulmonary:      Effort: Pulmonary effort is normal. No respiratory distress.      Breath sounds: Examination of the right-upper field reveals decreased breath sounds. Examination of the left-upper field reveals decreased breath sounds. Examination of the right-middle field reveals decreased breath sounds. Examination of the left-middle field reveals decreased breath sounds. Examination of the right-lower field reveals decreased breath sounds. Examination of the left-lower field reveals decreased breath sounds. Decreased breath sounds and wheezing present. No rhonchi.      Comments: Exp wheezing noted  Chest:      Chest wall: No tenderness.   Abdominal:      General: Abdomen is flat. Bowel sounds are normal.      Palpations: Abdomen is soft. Abdomen is not rigid.      Tenderness: There is no guarding.   Musculoskeletal:         General: Normal range of motion.      Cervical back: Normal range of motion and neck supple. No muscular tenderness.   Lymphadenopathy:      Cervical: No cervical adenopathy.   Skin:     General: Skin is warm and dry.      Capillary Refill: Capillary refill takes less than 2 seconds.   Neurological:      General: No focal deficit present.      Mental Status: She is alert and oriented to person, place, and time.   Psychiatric:         Mood and Affect: Mood normal.         Behavior: Behavior normal. Behavior is cooperative.         Thought Content: Thought content normal.         Judgment: Judgment normal.             Assessment:       1. Mild intermittent reactive airway disease without complication    2. Upper respiratory tract infection, unspecified type        Plan:       Bhavna was seen today for wheezing, cough and shortness of breath.    Diagnoses and all orders for this visit:    Mild intermittent reactive airway disease without complication  -     fluticasone-salmeterol  diskus inhaler 100-50 mcg; Inhale 1 puff into the lungs 2 (two) times daily. Controller  -     albuterol (PROVENTIL/VENTOLIN HFA) 90 mcg/actuation inhaler; Inhale 2 puffs into the lungs every 6 (six) hours as needed for Wheezing.  -     albuterol (PROVENTIL) 2.5 mg /3 mL (0.083 %) nebulizer solution; Take 3 mLs (2.5 mg total) by nebulization every 6 (six) hours as needed for Wheezing. Rescue    Upper respiratory tract infection, unspecified type  -     azithromycin (Z-KARLA) 250 MG tablet; Take 2 tablets by mouth on day 1; Take 1 tablet by mouth on days 2-5

## 2022-06-16 LAB
ALBUMIN SERPL-MCNC: 4.3 G/DL (ref 3.8–4.8)
ALBUMIN/GLOB SERPL: 2 {RATIO} (ref 1.2–2.2)
ALP SERPL-CCNC: 61 IU/L (ref 44–121)
ALT SERPL-CCNC: 13 IU/L (ref 0–32)
AST SERPL-CCNC: 16 IU/L (ref 0–40)
BILIRUB SERPL-MCNC: 0.6 MG/DL (ref 0–1.2)
BUN SERPL-MCNC: 7 MG/DL (ref 6–20)
BUN/CREAT SERPL: 11 (ref 9–23)
CALCIUM SERPL-MCNC: 8.8 MG/DL (ref 8.7–10.2)
CHLORIDE SERPL-SCNC: 105 MMOL/L (ref 96–106)
CHOLEST SERPL-MCNC: 179 MG/DL (ref 100–199)
CO2 SERPL-SCNC: 24 MMOL/L (ref 20–29)
CREAT SERPL-MCNC: 0.63 MG/DL (ref 0.57–1)
EST. GFR  (NO RACE VARIABLE): 119 ML/MIN/1.73
GLOBULIN SER CALC-MCNC: 2.2 G/DL (ref 1.5–4.5)
GLUCOSE SERPL-MCNC: 86 MG/DL (ref 65–99)
HCV AB S/CO SERPL IA: <0.1 S/CO RATIO (ref 0–0.9)
HDLC SERPL-MCNC: 43 MG/DL
HIV 1+2 AB+HIV1 P24 AG SERPL QL IA: NON REACTIVE
LDLC SERPL CALC-MCNC: 126 MG/DL (ref 0–99)
POTASSIUM SERPL-SCNC: 4.6 MMOL/L (ref 3.5–5.2)
PROT SERPL-MCNC: 6.5 G/DL (ref 6–8.5)
SODIUM SERPL-SCNC: 139 MMOL/L (ref 134–144)
TRIGL SERPL-MCNC: 50 MG/DL (ref 0–149)
TSH SERPL DL<=0.005 MIU/L-ACNC: 1.79 UIU/ML (ref 0.45–4.5)
VLDLC SERPL CALC-MCNC: 10 MG/DL (ref 5–40)

## 2022-06-21 ENCOUNTER — OFFICE VISIT (OUTPATIENT)
Dept: FAMILY MEDICINE | Facility: CLINIC | Age: 37
End: 2022-06-21
Payer: MEDICAID

## 2022-06-21 VITALS
TEMPERATURE: 99 F | BODY MASS INDEX: 31.16 KG/M2 | SYSTOLIC BLOOD PRESSURE: 120 MMHG | OXYGEN SATURATION: 98 % | WEIGHT: 187 LBS | DIASTOLIC BLOOD PRESSURE: 78 MMHG | HEIGHT: 65 IN | HEART RATE: 96 BPM

## 2022-06-21 DIAGNOSIS — J45.20 MILD INTERMITTENT REACTIVE AIRWAY DISEASE WITHOUT COMPLICATION: ICD-10-CM

## 2022-06-21 DIAGNOSIS — E78.5 MILD HYPERLIPIDEMIA: Primary | ICD-10-CM

## 2022-06-21 DIAGNOSIS — E66.09 CLASS 1 OBESITY DUE TO EXCESS CALORIES WITH SERIOUS COMORBIDITY AND BODY MASS INDEX (BMI) OF 31.0 TO 31.9 IN ADULT: ICD-10-CM

## 2022-06-21 PROCEDURE — 3074F PR MOST RECENT SYSTOLIC BLOOD PRESSURE < 130 MM HG: ICD-10-PCS | Mod: CPTII,,, | Performed by: NURSE PRACTITIONER

## 2022-06-21 PROCEDURE — 3074F SYST BP LT 130 MM HG: CPT | Mod: CPTII,,, | Performed by: NURSE PRACTITIONER

## 2022-06-21 PROCEDURE — 1160F RVW MEDS BY RX/DR IN RCRD: CPT | Mod: CPTII,,, | Performed by: NURSE PRACTITIONER

## 2022-06-21 PROCEDURE — 1159F MED LIST DOCD IN RCRD: CPT | Mod: CPTII,,, | Performed by: NURSE PRACTITIONER

## 2022-06-21 PROCEDURE — 99214 OFFICE O/P EST MOD 30 MIN: CPT | Performed by: NURSE PRACTITIONER

## 2022-06-21 PROCEDURE — 1159F PR MEDICATION LIST DOCUMENTED IN MEDICAL RECORD: ICD-10-PCS | Mod: CPTII,,, | Performed by: NURSE PRACTITIONER

## 2022-06-21 PROCEDURE — 3078F DIAST BP <80 MM HG: CPT | Mod: CPTII,,, | Performed by: NURSE PRACTITIONER

## 2022-06-21 PROCEDURE — 1160F PR REVIEW ALL MEDS BY PRESCRIBER/CLIN PHARMACIST DOCUMENTED: ICD-10-PCS | Mod: CPTII,,, | Performed by: NURSE PRACTITIONER

## 2022-06-21 PROCEDURE — 3008F PR BODY MASS INDEX (BMI) DOCUMENTED: ICD-10-PCS | Mod: CPTII,,, | Performed by: NURSE PRACTITIONER

## 2022-06-21 PROCEDURE — 99213 OFFICE O/P EST LOW 20 MIN: CPT | Mod: S$PBB,,, | Performed by: NURSE PRACTITIONER

## 2022-06-21 PROCEDURE — 3008F BODY MASS INDEX DOCD: CPT | Mod: CPTII,,, | Performed by: NURSE PRACTITIONER

## 2022-06-21 PROCEDURE — 99213 PR OFFICE/OUTPT VISIT, EST, LEVL III, 20-29 MIN: ICD-10-PCS | Mod: S$PBB,,, | Performed by: NURSE PRACTITIONER

## 2022-06-21 PROCEDURE — 3078F PR MOST RECENT DIASTOLIC BLOOD PRESSURE < 80 MM HG: ICD-10-PCS | Mod: CPTII,,, | Performed by: NURSE PRACTITIONER

## 2022-06-21 RX ORDER — FEXOFENADINE HCL 60 MG
60 TABLET ORAL DAILY
COMMUNITY
End: 2023-05-23

## 2022-06-21 RX ORDER — FLUTICASONE PROPIONATE AND SALMETEROL 250; 50 UG/1; UG/1
1 POWDER RESPIRATORY (INHALATION) 2 TIMES DAILY
Qty: 60 EACH | Refills: 5 | Status: SHIPPED | OUTPATIENT
Start: 2022-06-21 | End: 2022-10-28 | Stop reason: SDUPTHER

## 2022-06-21 NOTE — PROGRESS NOTES
Subjective:       Patient ID: Bhavna Heart is a 37 y.o. female.    Chief Complaint: Allergies and Follow-up    Hyperlipidemia  This is a chronic problem. The current episode started more than 1 month ago. The problem is controlled. Recent lipid tests were reviewed and are high. Exacerbating diseases include obesity. She has no history of hypothyroidism. Factors aggravating her hyperlipidemia include fatty foods. Pertinent negatives include no chest pain, focal weakness, leg pain or shortness of breath. She is currently on no antihyperlipidemic treatment. The current treatment provides moderate improvement of lipids. Compliance problems include adherence to exercise and adherence to diet.  Risk factors for coronary artery disease include stress and dyslipidemia.   Asthma  There is no chest tightness, cough, frequent throat clearing, hemoptysis or shortness of breath. This is a chronic problem. The current episode started more than 1 month ago. Asthma causes daytime symptoms weekly. Asthma causes nighttime symptoms weekly. The problem has been waxing and waning. Associated symptoms include dyspnea on exertion. Pertinent negatives include no appetite change, chest pain, ear pain, fever, headaches, nasal congestion, postnasal drip, sore throat, trouble swallowing or weight loss. Her symptoms are aggravated by emotional stress, change in weather, URI, pollen and strenuous activity. Her symptoms are alleviated by rest, beta-agonist and steroid inhaler. She reports moderate improvement on treatment. Her symptoms are not alleviated by rest and change in position. Her past medical history is significant for asthma. There is no history of bronchitis.     Review of Systems   Constitutional: Negative for activity change, appetite change, fever and weight loss.        Obesity   HENT: Negative for congestion, ear discharge, ear pain, postnasal drip, sore throat, trouble swallowing and voice change.    Eyes: Negative  for photophobia, pain, discharge and visual disturbance.   Respiratory: Negative for cough, hemoptysis, chest tightness and shortness of breath.         Asthma   Cardiovascular: Positive for dyspnea on exertion. Negative for chest pain and palpitations.        Hyperlipidemia   Gastrointestinal: Negative for abdominal pain, nausea and vomiting.   Endocrine: Negative for cold intolerance and heat intolerance.   Genitourinary: Negative for difficulty urinating and dysuria.   Musculoskeletal: Negative for arthralgias and gait problem.   Skin: Negative for rash.   Allergic/Immunologic: Negative for immunocompromised state.   Neurological: Negative for focal weakness, speech difficulty and headaches.   Psychiatric/Behavioral: Negative for confusion, self-injury and suicidal ideas.     History reviewed. No pertinent past medical history. History reviewed. No pertinent surgical history.    Family History   Problem Relation Age of Onset    No Known Problems Mother     Asthma Father     Asthma Maternal Aunt     Cancer Paternal Uncle     Diabetes Maternal Grandmother     Thyroid disease Maternal Grandmother     Cancer Maternal Grandfather     Kidney disease Maternal Grandfather     Heart disease Paternal Grandfather     Stroke Paternal Grandfather        Social History     Socioeconomic History    Marital status: Single   Tobacco Use    Smoking status: Former Smoker     Packs/day: 0.50     Quit date: 2022     Years since quittin.3    Smokeless tobacco: Never Used   Substance and Sexual Activity    Alcohol use: No    Drug use: No    Sexual activity: Yes     Partners: Male       Current Outpatient Medications   Medication Sig Dispense Refill    albuterol (PROVENTIL) 2.5 mg /3 mL (0.083 %) nebulizer solution Take 3 mLs (2.5 mg total) by nebulization every 6 (six) hours as needed for Wheezing. Rescue 1 each 2    albuterol (PROVENTIL/VENTOLIN HFA) 90 mcg/actuation inhaler Inhale 2 puffs into the lungs  "every 6 (six) hours as needed for Wheezing. 18 g 1    azelastine (ASTELIN) 137 mcg (0.1 %) nasal spray 1 spray (137 mcg total) by Nasal route 2 (two) times daily. 30 mL 2    fexofenadine (ALLEGRA) 60 MG tablet Take 60 mg by mouth once daily.      fluticasone propionate (FLONASE) 50 mcg/actuation nasal spray 1 spray (50 mcg total) by Each Nostril route 2 (two) times daily. 16 g 2    fexofenadine-pseudoephedrine (ALLEGRA-D 24) 180-240 mg per 24 hr tablet Take 1 tablet by mouth once daily.      fluticasone-salmeterol diskus inhaler 250-50 mcg Inhale 1 puff into the lungs 2 (two) times daily. Controller 60 each 5     No current facility-administered medications for this visit.       Review of patient's allergies indicates:   Allergen Reactions    Doxycycline Nausea Only     Objective:      Blood pressure 120/78, pulse 96, temperature 98.8 °F (37.1 °C), height 5' 4.5" (1.638 m), weight 84.8 kg (187 lb), last menstrual period 06/17/2022, SpO2 98 %, unknown if currently breastfeeding. Body mass index is 31.6 kg/m².   Physical Exam  Vitals and nursing note reviewed.   Constitutional:       General: She is not in acute distress.     Appearance: Normal appearance. She is well-developed.   HENT:      Head: Normocephalic and atraumatic.      Right Ear: Tympanic membrane, ear canal and external ear normal.      Left Ear: Tympanic membrane, ear canal and external ear normal.      Nose: Nose normal.      Mouth/Throat:      Mouth: Mucous membranes are moist.      Pharynx: Oropharynx is clear.   Eyes:      General: Lids are normal. Lids are everted, no foreign bodies appreciated.      Conjunctiva/sclera: Conjunctivae normal.      Pupils: Pupils are equal, round, and reactive to light.      Right eye: Pupil is round and reactive.      Left eye: Pupil is round and reactive.   Neck:      Trachea: Trachea normal.   Cardiovascular:      Rate and Rhythm: Normal rate and regular rhythm.      Pulses: Normal pulses.      Heart sounds: " Normal heart sounds, S1 normal and S2 normal. No murmur heard.  Pulmonary:      Effort: Pulmonary effort is normal. No respiratory distress.      Breath sounds: Normal breath sounds. No wheezing.   Abdominal:      General: Abdomen is flat. Bowel sounds are normal.      Palpations: Abdomen is soft. Abdomen is not rigid.      Tenderness: There is no guarding.   Musculoskeletal:         General: Normal range of motion.      Cervical back: Normal range of motion and neck supple. No muscular tenderness.   Lymphadenopathy:      Cervical: No cervical adenopathy.   Skin:     General: Skin is warm and dry.      Capillary Refill: Capillary refill takes less than 2 seconds.   Neurological:      General: No focal deficit present.      Mental Status: She is alert and oriented to person, place, and time.   Psychiatric:         Mood and Affect: Mood normal.         Behavior: Behavior normal. Behavior is cooperative.         Thought Content: Thought content normal.         Judgment: Judgment normal.             Assessment:       1. Mild hyperlipidemia    2. Mild intermittent reactive airway disease without complication    3. Class 1 obesity due to excess calories with serious comorbidity and body mass index (BMI) of 31.0 to 31.9 in adult        Plan:       Bhavna was seen today for allergies and follow-up.    Diagnoses and all orders for this visit:    Mild hyperlipidemia  -     Lipid Panel; Future  -     Lipid Panel    Limit red meat, butter, fried foods, cheese, and other foods that have a lot of saturated fat. Consume more: lean meats, fish, fruits, vegetables, whole grains, beans, lentils, and nuts.  Weight loss, and 30-45 min of cardiovascular exercise daily.     Mild intermittent reactive airway disease without complication  -     fluticasone-salmeterol diskus inhaler 250-50 mcg; Inhale 1 puff into the lungs 2 (two) times daily. Controller    Class 1 obesity due to excess calories with serious comorbidity and body mass index  (BMI) of 31.0 to 31.9 in adult  The patient is asked to make an attempt to improve diet and exercise patterns to aid in medical management of this problem.

## 2022-12-10 LAB
CHOLEST SERPL-MCNC: 175 MG/DL (ref 100–199)
HDLC SERPL-MCNC: 44 MG/DL
LDLC SERPL CALC-MCNC: 121 MG/DL (ref 0–99)
TRIGL SERPL-MCNC: 52 MG/DL (ref 0–149)
VLDLC SERPL CALC-MCNC: 10 MG/DL (ref 5–40)

## 2022-12-12 ENCOUNTER — OFFICE VISIT (OUTPATIENT)
Dept: FAMILY MEDICINE | Facility: CLINIC | Age: 37
End: 2022-12-12
Payer: MEDICAID

## 2022-12-12 VITALS
DIASTOLIC BLOOD PRESSURE: 80 MMHG | WEIGHT: 185 LBS | HEART RATE: 86 BPM | OXYGEN SATURATION: 98 % | BODY MASS INDEX: 30.82 KG/M2 | HEIGHT: 65 IN | SYSTOLIC BLOOD PRESSURE: 120 MMHG | TEMPERATURE: 98 F

## 2022-12-12 DIAGNOSIS — J30.1 ALLERGIC RHINITIS DUE TO POLLEN, UNSPECIFIED SEASONALITY: ICD-10-CM

## 2022-12-12 DIAGNOSIS — J45.20 MILD INTERMITTENT REACTIVE AIRWAY DISEASE WITHOUT COMPLICATION: ICD-10-CM

## 2022-12-12 DIAGNOSIS — M25.551 BILATERAL HIP PAIN: ICD-10-CM

## 2022-12-12 DIAGNOSIS — M25.552 BILATERAL HIP PAIN: ICD-10-CM

## 2022-12-12 DIAGNOSIS — E78.5 MILD HYPERLIPIDEMIA: Primary | ICD-10-CM

## 2022-12-12 DIAGNOSIS — J03.90 TONSILLITIS: ICD-10-CM

## 2022-12-12 DIAGNOSIS — E66.09 CLASS 1 OBESITY DUE TO EXCESS CALORIES WITH SERIOUS COMORBIDITY AND BODY MASS INDEX (BMI) OF 31.0 TO 31.9 IN ADULT: ICD-10-CM

## 2022-12-12 PROCEDURE — 3008F BODY MASS INDEX DOCD: CPT | Mod: CPTII,,, | Performed by: NURSE PRACTITIONER

## 2022-12-12 PROCEDURE — 3079F PR MOST RECENT DIASTOLIC BLOOD PRESSURE 80-89 MM HG: ICD-10-PCS | Mod: CPTII,,, | Performed by: NURSE PRACTITIONER

## 2022-12-12 PROCEDURE — 3074F SYST BP LT 130 MM HG: CPT | Mod: CPTII,,, | Performed by: NURSE PRACTITIONER

## 2022-12-12 PROCEDURE — 99215 OFFICE O/P EST HI 40 MIN: CPT | Performed by: NURSE PRACTITIONER

## 2022-12-12 PROCEDURE — 1159F MED LIST DOCD IN RCRD: CPT | Mod: CPTII,,, | Performed by: NURSE PRACTITIONER

## 2022-12-12 PROCEDURE — 3079F DIAST BP 80-89 MM HG: CPT | Mod: CPTII,,, | Performed by: NURSE PRACTITIONER

## 2022-12-12 PROCEDURE — 1160F RVW MEDS BY RX/DR IN RCRD: CPT | Mod: CPTII,,, | Performed by: NURSE PRACTITIONER

## 2022-12-12 PROCEDURE — 3074F PR MOST RECENT SYSTOLIC BLOOD PRESSURE < 130 MM HG: ICD-10-PCS | Mod: CPTII,,, | Performed by: NURSE PRACTITIONER

## 2022-12-12 PROCEDURE — 1159F PR MEDICATION LIST DOCUMENTED IN MEDICAL RECORD: ICD-10-PCS | Mod: CPTII,,, | Performed by: NURSE PRACTITIONER

## 2022-12-12 PROCEDURE — 99214 PR OFFICE/OUTPT VISIT, EST, LEVL IV, 30-39 MIN: ICD-10-PCS | Mod: S$PBB,,, | Performed by: NURSE PRACTITIONER

## 2022-12-12 PROCEDURE — 1160F PR REVIEW ALL MEDS BY PRESCRIBER/CLIN PHARMACIST DOCUMENTED: ICD-10-PCS | Mod: CPTII,,, | Performed by: NURSE PRACTITIONER

## 2022-12-12 PROCEDURE — 99214 OFFICE O/P EST MOD 30 MIN: CPT | Mod: S$PBB,,, | Performed by: NURSE PRACTITIONER

## 2022-12-12 PROCEDURE — 3008F PR BODY MASS INDEX (BMI) DOCUMENTED: ICD-10-PCS | Mod: CPTII,,, | Performed by: NURSE PRACTITIONER

## 2022-12-12 RX ORDER — AMOXICILLIN 875 MG/1
875 TABLET, FILM COATED ORAL EVERY 12 HOURS
Qty: 20 TABLET | Refills: 0 | Status: SHIPPED | OUTPATIENT
Start: 2022-12-12 | End: 2023-05-23

## 2022-12-12 NOTE — PROGRESS NOTES
Subjective:       Patient ID: Bhavna Heart is a 37 y.o. female.    Chief Complaint: Asthma, Hyperlipidemia, and Follow-up    Hyperlipidemia  This is a chronic problem. The current episode started more than 1 month ago. The problem is controlled. Recent lipid tests were reviewed and are variable. Exacerbating diseases include obesity. She has no history of hypothyroidism. Factors aggravating her hyperlipidemia include fatty foods. Pertinent negatives include no chest pain, focal weakness, leg pain or shortness of breath. Current antihyperlipidemic treatment includes diet change. The current treatment provides mild improvement of lipids. Compliance problems include adherence to exercise and adherence to diet.  Risk factors for coronary artery disease include stress and dyslipidemia.   Asthma  She complains of hoarse voice. There is no chest tightness, cough, frequent throat clearing, hemoptysis or shortness of breath. This is a chronic problem. The current episode started more than 1 month ago. Asthma causes daytime symptoms weekly. Asthma causes nighttime symptoms weekly. The problem has been waxing and waning. Associated symptoms include dyspnea on exertion and a sore throat. Pertinent negatives include no appetite change, chest pain, ear pain, fever, headaches, nasal congestion, postnasal drip, trouble swallowing or weight loss. Her symptoms are aggravated by emotional stress, change in weather, URI, pollen and strenuous activity. Her symptoms are alleviated by rest, beta-agonist and steroid inhaler. She reports moderate improvement on treatment. Her symptoms are not alleviated by rest and change in position. Her past medical history is significant for asthma. There is no history of bronchitis.   Follow-up  This is a chronic problem. The current episode started more than 1 month ago. The problem occurs daily. The problem has been waxing and waning. Associated symptoms include a sore throat. Pertinent  negatives include no abdominal pain, arthralgias, chest pain, congestion, coughing, fever, headaches, nausea, numbness, rash or vomiting. The symptoms are aggravated by exertion, walking and stress. She has tried rest, position changes and NSAIDs for the symptoms. The treatment provided moderate relief.   Hip Pain   The incident occurred more than 1 week ago. There was no injury mechanism (increased physical activity). The quality of the pain is described as aching. The pain is moderate. The pain has been Fluctuating since onset. Associated symptoms include muscle weakness. Pertinent negatives include no inability to bear weight, loss of motion, loss of sensation, numbness or tingling. She reports no foreign bodies present. The symptoms are aggravated by movement and weight bearing. She has tried rest and NSAIDs for the symptoms. The treatment provided moderate relief.   Sore Throat   This is a new problem. The current episode started 1 to 4 weeks ago. The problem has been waxing and waning. There has been no fever. The pain is moderate. Associated symptoms include a hoarse voice. Pertinent negatives include no abdominal pain, congestion, coughing, ear discharge, ear pain, headaches, shortness of breath, trouble swallowing or vomiting. She has had no exposure to strep or mono. She has tried cool liquids and NSAIDs for the symptoms. The treatment provided mild relief.   Review of Systems   Constitutional:  Negative for activity change, appetite change, fever and weight loss.        Obesity   HENT:  Positive for hoarse voice and sore throat. Negative for congestion, ear discharge, ear pain, postnasal drip, trouble swallowing and voice change.    Eyes:  Negative for photophobia, pain, discharge and visual disturbance.   Respiratory:  Negative for cough, hemoptysis, chest tightness and shortness of breath.         Asthma   Cardiovascular:  Positive for dyspnea on exertion. Negative for chest pain and palpitations.         Hyperlipidemia   Gastrointestinal:  Negative for abdominal pain, nausea and vomiting.   Endocrine: Negative for cold intolerance and heat intolerance.   Genitourinary:  Negative for difficulty urinating and dysuria.   Musculoskeletal:  Negative for arthralgias and gait problem.   Skin:  Negative for rash.   Allergic/Immunologic: Negative for immunocompromised state.   Neurological:  Negative for tingling, focal weakness, speech difficulty, numbness and headaches.   Psychiatric/Behavioral:  Negative for confusion, self-injury and suicidal ideas.    History reviewed. No pertinent past medical history. History reviewed. No pertinent surgical history.    Family History   Problem Relation Age of Onset    No Known Problems Mother     Asthma Father     Asthma Maternal Aunt     Cancer Paternal Uncle     Diabetes Maternal Grandmother     Thyroid disease Maternal Grandmother     Cancer Maternal Grandfather     Kidney disease Maternal Grandfather     Heart disease Paternal Grandfather     Stroke Paternal Grandfather        Social History     Socioeconomic History    Marital status: Single   Tobacco Use    Smoking status: Former     Packs/day: 0.50     Types: Cigarettes     Quit date: 2022     Years since quittin.8    Smokeless tobacco: Never   Substance and Sexual Activity    Alcohol use: No    Drug use: No    Sexual activity: Yes     Partners: Male       Current Outpatient Medications   Medication Sig Dispense Refill    albuterol (PROVENTIL) 2.5 mg /3 mL (0.083 %) nebulizer solution Take 3 mLs (2.5 mg total) by nebulization every 6 (six) hours as needed for Wheezing. Rescue 1 each 2    albuterol (PROVENTIL/VENTOLIN HFA) 90 mcg/actuation inhaler Inhale 2 puffs into the lungs every 6 (six) hours as needed for Wheezing. 18 g 1    azelastine (ASTELIN) 137 mcg (0.1 %) nasal spray SPRAY 1 SPRAY BY NASAL ROUTE 2 TIMES DAILY. 30 mL 5    fluticasone propionate (FLONASE) 50 mcg/actuation nasal spray SPRAY 1 SPRAY INTO EACH  "NOSTRIL TWICE A DAY 16 mL 2    fluticasone-salmeterol diskus inhaler 250-50 mcg Inhale 1 puff into the lungs 2 (two) times daily. Controller 60 each 5    amoxicillin (AMOXIL) 875 MG tablet Take 1 tablet (875 mg total) by mouth every 12 (twelve) hours. 20 tablet 0    fexofenadine (ALLEGRA) 60 MG tablet Take 60 mg by mouth once daily.      fexofenadine-pseudoephedrine (ALLEGRA-D 24) 180-240 mg per 24 hr tablet Take 1 tablet by mouth once daily.       No current facility-administered medications for this visit.       Review of patient's allergies indicates:   Allergen Reactions    Doxycycline Nausea Only     Objective:      Blood pressure 120/80, pulse 86, temperature 97.5 °F (36.4 °C), height 5' 4.5" (1.638 m), weight 83.9 kg (185 lb), last menstrual period 11/28/2022, SpO2 98 %, unknown if currently breastfeeding. Body mass index is 31.26 kg/m².   Physical Exam  Vitals and nursing note reviewed.   Constitutional:       General: She is not in acute distress.     Appearance: Normal appearance. She is well-developed.   HENT:      Head: Normocephalic and atraumatic.      Right Ear: Tympanic membrane, ear canal and external ear normal.      Left Ear: Tympanic membrane, ear canal and external ear normal.      Nose: Nose normal.      Mouth/Throat:      Mouth: Mucous membranes are moist.      Pharynx: Oropharyngeal exudate and posterior oropharyngeal erythema present.   Eyes:      General: Lids are normal. Lids are everted, no foreign bodies appreciated.      Conjunctiva/sclera: Conjunctivae normal.      Pupils: Pupils are equal, round, and reactive to light.      Right eye: Pupil is round and reactive.      Left eye: Pupil is round and reactive.   Neck:      Trachea: Trachea normal.   Cardiovascular:      Rate and Rhythm: Normal rate and regular rhythm.      Pulses: Normal pulses.      Heart sounds: Normal heart sounds, S1 normal and S2 normal. No murmur heard.  Pulmonary:      Effort: Pulmonary effort is normal. No " respiratory distress.      Breath sounds: Normal breath sounds. No wheezing.   Abdominal:      General: Abdomen is flat. Bowel sounds are normal.      Palpations: Abdomen is soft. Abdomen is not rigid.      Tenderness: There is no guarding.   Musculoskeletal:         General: Normal range of motion.      Cervical back: Normal range of motion and neck supple. No muscular tenderness.   Lymphadenopathy:      Cervical: No cervical adenopathy.   Skin:     General: Skin is warm and dry.      Capillary Refill: Capillary refill takes less than 2 seconds.   Neurological:      General: No focal deficit present.      Mental Status: She is alert and oriented to person, place, and time.   Psychiatric:         Mood and Affect: Mood normal.         Behavior: Behavior normal. Behavior is cooperative.         Thought Content: Thought content normal.         Judgment: Judgment normal.           Assessment:       1. Mild hyperlipidemia    2. Mild intermittent reactive airway disease without complication    3. Tonsillitis    4. Bilateral hip pain    5. Allergic rhinitis due to pollen, unspecified seasonality    6. Class 1 obesity due to excess calories with serious comorbidity and body mass index (BMI) of 31.0 to 31.9 in adult          Plan:       Bhavna was seen today for asthma, hyperlipidemia and follow-up.    Diagnoses and all orders for this visit:    Mild hyperlipidemia  -     Lipid Panel; Future    Increase exercise.  Limit red meat, butter, fried foods, cheese, and other foods that have a lot of saturated fat. Consume more: lean meats, fish, fruits, vegetables, whole grains, beans, lentils, and nuts.  Weight loss, and 30-45 min of cardiovascular exercise daily.      Mild intermittent reactive airway disease without complication  Stable on current inhalers. Using prn.     Tonsillitis  -     amoxicillin (AMOXIL) 875 MG tablet; Take 1 tablet (875 mg total) by mouth every 12 (twelve) hours.    Bilateral hip pain  -     Ambulatory  referral/consult to Physical/Occupational Therapy; Future    Allergic rhinitis due to pollen, unspecified seasonality  Flonase as prescribed    Class 1 obesity due to excess calories with serious comorbidity and body mass index (BMI) of 31.0 to 31.9 in adult  The patient is asked to make an attempt to improve diet and exercise patterns to aid in medical management of this problem.          Follow up with me in 6 months for asthma and cholesterol. Labs to be completed before office visit.

## 2023-01-20 ENCOUNTER — CLINICAL SUPPORT (OUTPATIENT)
Dept: REHABILITATION | Facility: HOSPITAL | Age: 38
End: 2023-01-20
Payer: MEDICAID

## 2023-01-20 DIAGNOSIS — M25.552 BILATERAL HIP PAIN: ICD-10-CM

## 2023-01-20 DIAGNOSIS — M25.551 CHRONIC HIP PAIN, BILATERAL: ICD-10-CM

## 2023-01-20 DIAGNOSIS — R29.898 DECREASED STRENGTH OF LOWER EXTREMITY: ICD-10-CM

## 2023-01-20 DIAGNOSIS — M25.551 BILATERAL HIP PAIN: ICD-10-CM

## 2023-01-20 DIAGNOSIS — M25.552 CHRONIC HIP PAIN, BILATERAL: ICD-10-CM

## 2023-01-20 DIAGNOSIS — G89.29 CHRONIC HIP PAIN, BILATERAL: ICD-10-CM

## 2023-01-20 PROCEDURE — 97161 PT EVAL LOW COMPLEX 20 MIN: CPT | Mod: PN

## 2023-01-20 NOTE — PLAN OF CARE
[facility]   Physical Therapy Initial Evaluation     Date: 1/20/2023   [facility]  Clinic Number: [id number]    Therapy Diagnosis:   Encounter Diagnoses   Name Primary?    Bilateral hip pain     Decreased strength of lower extremity     Chronic hip pain, bilateral      Physician: Julieta García FNP-C    Physician Orders: PT Eval and Treat  Medical Diagnosis from Referral: M25.551,M25.552 (ICD-10-CM) - Bilateral hip pain  Evaluation Date: 1/20/2023  Authorization Period Expiration: 12/29/2023  Plan of Care Expiration: 4/20/2023  Progress Note Due: 2/20/2023  Visit # / Visits authorized: 1/ 1   FOTO: 1/3    Precautions: Standard     Time In: 1305  Time Out: 1340  Total Appointment Time (timed & untimed codes): 35 minutes      SUBJECTIVE     Date of onset: after last child 5 years ago, then hit 32, and then it wasn't bad until she started to cut grass 1 year ago in which she started to help her neighbor     History of current condition - Bhavna reports: when she sleeps on her sides she has pain. Reports she cant sleep on the sides or the back. She has to sleep on her belly. L worse than the R. Has pain with walking standing and bending over. Reports increased pain with activity and if she cross her legs or puts her legs up on the side she notices the pain there as well. Denies numbness and tingling. Reports her neighbor bought some kind of compress sleeve for sciatica and places it on her calf and she states it helps some. Denies change in bowel and bladders.     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    Surgical History:   Bhavna Heart  has no past surgical history on file.    Medications:   Bhavna has a current medication list which includes the following prescription(s): albuterol, albuterol, amoxicillin, azelastine, fexofenadine, fexofenadine-pseudoephedrine, fluticasone propionate, and fluticasone-salmeterol 250-50 mcg/dose.    Allergies:   Review of patient's allergies indicates:   Allergen Reactions    Doxycycline Nausea Only        OBJECTIVE     Structural/Postural Inspection:     Standing: level iliac crest, neutral PSIS, normal spinal curvatures  Assess foot and ankle alignment at first follow up     SLS stance:  pain in SLS bilaterally     Active Range of Motion (AROM)/Passive Range of Motion (PROM):     Lumbar AROM (Degrees) PROM (Degrees) Comments   Flexion WFL  Pulling in gluts    Extension Hypermobility   Hinge point L 2   Right Side Bend WFL  Pulling on L side and hip    Left Side Bend WFL  Pull on R side and hip    Right Rotation WFL     Left Rotation WFL       Hip/Knee/Ankle (Degrees) AROM (Right) PROM (Right) AROM (Left) PROM (Left) Comments   Hip Flexion WFL  WFL     Hip Extension WFL  WFL     Hip Abduction WFL  WFL  Pain    Hip Adduction WFL  WFL  Pain    Hip Internal Rotation WFL  WFL     Hip External Rotation WFL  WFL       Joint Accessory:  normal joint assessment of B hip joints in all directions     Muscle Length Tension Testing:     Lumbar/Hip/Knee Right  Left  Comments   Eric Test NT NT    Hamstring Length (SLR) WFL WFL    Ely's Test - -    Shorty's Test - -    Piriformis Length Test Pain lateral hip joint Pain lateral hip joint       Pain  with testing MMT in all directions    RLE Strength Grade LLE Strength Grade   Hip Flexion 4/5 Hip Flexion 4/5   Hip Extension 4-/5 Hip Extension 4-/5   Hip Abduction 3+/5 Hip Abduction 3+/5   Hip Adduction 4/5 Hip Adduction 4/5   Hip Internal Rotation 3+/5 Hip Internal Rotation 3+/5   Hip External Rotation 4/5 Hip External Rotation 4/5   Knee Flexion 5/5 Knee Flexion 5/5   Knee Extension 5/5 Knee Extension 5/5   Ankle Dorsiflexion 5/5 Ankle Dorsiflexion 5/5   Ankle Plantarflexion NT Ankle Plantarflexion NT                Special Tests:    Lumbar/SI Results Comments   FABERs Positive Lateral hip joint pain       Hip Right Left   Trendelenburg Test Negative. Negative.   FADDIR Test Positive. Pain Positive. Pain    Scour Test NT NT   Sign of Buttock  NT NT   Long [place] Distraction NT NT     Movement Analysis:  SLS on Right: pain  SLS on Left: pain   Sit <> Stand: antalgic   Stairs: NT  Gait: normal gait, neg for deviations     Palpation for Tenderness:  positive for tenderness to palpation of B greater trochanters, glut med at insertion and origin, piriformis     Sensation:     Neuro Testing Right  Left Comments   SLR (Sciatic) Negative. Negative.      Limitation/Restriction for FOTO 1/20/2023 Survey    Therapist reviewed FOTO scores for Bhavna Heart on 1/20/2023.   FOTO documents entered into EPIC - see Media section.    Limitation Score: 62% (38/100)         TREATMENT     Total Treatment time (time-based codes) separate from Evaluation: 2 minutes      Bhavna received the treatments listed below:      therapeutic exercises to develop strength, endurance, ROM, flexibility, posture, and core stabilization for 2 minutes including:    Educated patient on HEP exercises. Had pt return demonstration to assess understanding and proper form. Pt demonstrated good understanding based on returned demonstration and verbalizations.     PATIENT EDUCATION ND HOME EXERCISES     Education provided:   - HEP  - therapy goals    - therapy diagnosis     Written Home Exercises Provided: yes. Exercises were reviewed and Bhavna was able to demonstrate them prior to the end of the session.  Bhavna demonstrated good  understanding of the education provided. See EMR under Patient Instructions for exercises provided during therapy sessions.    ASSESSMENT     Bhavna is a 37 y.o. female referred to outpatient Physical Therapy with a medical diagnosis of M25.551,M25.552 (ICD-10-CM) - Bilateral hip pain. Patient presents with c/o chronic B hip pain with recent worsening after increasing her activity with cutting grass for her neighbor. She has difficulty with functional mobility and activity participation. She presents with signs and symptoms consistent with B gluteal medius tendinopathy. She reports pain with SLS, pain with resisted hip MMT, tenderness to palpation at glut med insertion, pain with piriformis testing position, and decreased LE strength. With lumbar screening, she also presents with low back pain with hinge point during lumbar extension in standing and prone press ups. Repeated lumbar testing did not reproduce hip pain only low back pain. Patient will benefit from skilled Physical Therapist services to address above deficits to improve functional mobility and quality of life and return to cutting grass.     Patient prognosis is Good.   Patient will benefit from skilled outpatient Physical Therapy to address the deficits stated above and in the chart below, provide patient /family education, and to maximize patientt's level of independence.     Plan of care discussed with patient: Yes  Patient's spiritual, cultural and educational needs considered and patient is agreeable to the plan of care and goals as stated below:     Anticipated Barriers for therapy: none     Medical Necessity is demonstrated by the following  History  Co-morbidities and personal factors that may impact the plan of care Co-morbidities:   High BMI    Personal Factors:    no deficits     moderate   Examination  Body Structures and Functions, activity limitations and participation restrictions that may impact the plan of care Body Regions:   back  lower extremities    Body Systems:    strength  gross coordinated movement  balance  gait  transfers  transitions    Participation Restrictions:   Difficulty with IADLs and ADL due to kelly     Activity limitations:   Learning and applying knowledge  no deficits    General Tasks and Commands  no deficits    Communication  no deficits    Mobility  lifting and carrying objects  walking    Self care  no deficits    Domestic Life  shopping  cooking  doing house work (cleaning house, washing dishes, laundry)    Interactions/Relationships  no deficits    Life Areas  no deficits    Community and Social Life  community life  recreation and leisure         high   Clinical Presentation stable and uncomplicated low   Decision Making/ Complexity Score: low       Goals:    STG  Weeks/Visits Date Established  Goal Status    1.Patient will report </= 2/10 current pain and </= 5/10 for at worst pain over the past week to improve quality of life  3 weeks/  6 visits  1/20/2023      2. Patient will perform SLS for >/= 15 sec without pain to improve community mobility  3 weeks/  6 visits  1/20/2023      3. Patient will ambulate 1 flight of stairs with </= 3/10 pain to improve functional mobility  3 weeks/  6 visits  1/20/2023      4.Patient will report decreased tenderness to palpation to improve ability to lay on her sides  3 weeks/  6 visits  1/20/2023      5.Patient will report </= 55% limitation on FOTO to improve return to PLOF  3 weeks/  6 visits  1/20/2023        LTG Weeks/Visits Date Established  Goal Status    1.Patient will report 0/10 current pain and </= 3/10 for at worst pain over the past week to improve quality of life  6 weeks/ 12 visits  1/20/2023      2. Patient will perform SLS for >/= 30 sec without pain to improve community mobility  6  weeks/ 12 visits  1/20/2023        3.Patient will ambulate 1 flight of stairs with </= 1/10 pain to improve functional mobility  6 weeks/ 12 visits  1/20/2023      4.Patient will report </= 48% limitation on FOTO to improve return to PLOF  6 weeks/ 12 visits  1/20/2023      5.Patient will perform hip AROM without pain to improve return to cutting grass 6 weeks/ 12 visits  1/20/2023            PLAN   Plan of care Certification: 1/20/2023 to 4/20/2023.    Outpatient Physical Therapy 2 times weekly for 6 weeks to include the following interventions: Electrical Stimulation TENS, Gait Training, Manual Therapy, Moist Heat/ Ice, Neuromuscular Re-ed, Patient Education, Therapeutic Activities, Therapeutic Exercise, and Dry Needling .     Carla Valentin, PT      I CERTIFY THE NEED FOR THESE SERVICES FURNISHED UNDER THIS PLAN OF TREATMENT AND WHILE UNDER MY CARE   Physician's comments:     Physician's Signature: ___________________________________________________

## 2023-01-23 ENCOUNTER — CLINICAL SUPPORT (OUTPATIENT)
Dept: REHABILITATION | Facility: HOSPITAL | Age: 38
End: 2023-01-23
Payer: MEDICAID

## 2023-01-23 DIAGNOSIS — G89.29 CHRONIC HIP PAIN, BILATERAL: ICD-10-CM

## 2023-01-23 DIAGNOSIS — M25.552 CHRONIC HIP PAIN, BILATERAL: ICD-10-CM

## 2023-01-23 DIAGNOSIS — R29.898 DECREASED STRENGTH OF LOWER EXTREMITY: Primary | ICD-10-CM

## 2023-01-23 DIAGNOSIS — M25.551 CHRONIC HIP PAIN, BILATERAL: ICD-10-CM

## 2023-01-23 PROCEDURE — 97110 THERAPEUTIC EXERCISES: CPT | Mod: PN,CQ

## 2023-01-23 NOTE — PROGRESS NOTES
"[facility]   Physical Therapy Treatment Note     Name: Bhavna Heart  Clinic Number: [id number]    Therapy Diagnosis:   Encounter Diagnoses   Name Primary?    Decreased strength of lower extremity Yes    Chronic hip pain, bilateral      Physician: Julieta García FNP-C    Visit Date: 1/23/2023       Physician Orders: PT Eval and Treat  Medical Diagnosis from Referral: M25.551,M25.552 (ICD-10-CM) - Bilateral hip pain  Evaluation Date: 1/20/2023  Authorization Period Expiration: 12/31/2023  Plan of Care Expiration: 4/20/2023  Progress Note Due: 2/20/2023  Visit # / Visits authorized: 1/ 20   FOTO: 1/3     Precautions: Standard      Time In: 1305  Time Out: 1400  Total Appointment Time (timed & untimed codes): 55 minutes       PTA Visit #: 1/5     FOTO first follow up:   FOTO second follow up:       SUBJECTIVE     Pt reports: She was not able to perform her HEP due to being busy caring for her kids. She continues to have B hip pain and midline low back pain.  She was compliant with home exercise program.  Response to previous treatment: positive   Functional change: first treat after eval     Pain: 5/10  Location: bilateral hips      OBJECTIVE     Objective Measures updated at progress report unless specified.     Treatment     Bhavna received the treatments listed below:      manual therapy techniques: Joint mobilizations and Manual traction were applied to the: B hips and low back for 15 minutes, including:  Lumbar gapping mobs L3-L4   LAD B   Manual Lumbar distraction     therapeutic exercises to develop strength, endurance, posture, and core stabilization for 40 minutes including:  Hip extension, x 2 min    Calf raises,  x 2 min   Transverse abdominis press with 5" holds, 2 x for 2 min   HS curls seated edge of bed, x 2 min B  PPT, 3 x 10 5" holds   BKFO, 2 x 10 GTB   Repeated lumbar extension, x 30 (increased pain)    Patient Education and Home Exercises     Home Exercises " Provided and Patient Education Provided     Education provided:   - Continue HEP     Written Home Exercises Provided: Patient instructed to cont prior HEP. Exercises were reviewed and Bhavna was able to demonstrate them prior to the end of the session.  Bhavna demonstrated good  understanding of the education provided. See EMR under Patient Instructions for exercises provided during therapy sessions    ASSESSMENT     Patient presented with no radicular symptoms but light numbness began following isometric series. Lower lumbar mobility assessed and seemed as L3/L4 segment was not forward flexing well therefore gapping performed followed by PPT. Patient stated she had low back pain at midline following therefore repeated extension performed but patient reported increased pain following extensions. Manual lumbar traction improved both radicular and low back symptoms.     Bhavna Is progressing well towards her goals.   Pt prognosis is Good.     Pt will continue to benefit from skilled outpatient physical therapy to address the deficits listed in the problem list box on initial evaluation, provide pt/family education and to maximize pt's level of independence in the home and community environment.     Pt's spiritual, cultural and educational needs considered and pt agreeable to plan of care and goals.     Anticipated barriers to physical therapy: none    Goals: Physical therapist creating goals    PLAN     Continue to treat and progress per POC and pt tolerance         Kulwant Wrihgt, PTA

## 2023-01-27 ENCOUNTER — CLINICAL SUPPORT (OUTPATIENT)
Dept: REHABILITATION | Facility: HOSPITAL | Age: 38
End: 2023-01-27
Payer: MEDICAID

## 2023-01-27 DIAGNOSIS — G89.29 CHRONIC HIP PAIN, BILATERAL: ICD-10-CM

## 2023-01-27 DIAGNOSIS — M25.552 CHRONIC HIP PAIN, BILATERAL: ICD-10-CM

## 2023-01-27 DIAGNOSIS — M25.551 CHRONIC HIP PAIN, BILATERAL: ICD-10-CM

## 2023-01-27 DIAGNOSIS — R29.898 DECREASED STRENGTH OF LOWER EXTREMITY: Primary | ICD-10-CM

## 2023-01-27 PROCEDURE — 97110 THERAPEUTIC EXERCISES: CPT | Mod: PN,CQ

## 2023-01-27 NOTE — PROGRESS NOTES
"  [facility]   Physical Therapy Treatment Note     Name: Bhavna Heart  Clinic Number: [id number]    Therapy Diagnosis:   Encounter Diagnoses   Name Primary?    Decreased strength of lower extremity Yes    Chronic hip pain, bilateral      Physician: Julieta García FNP-C    Visit Date: 1/27/2023       Physician Orders: PT Eval and Treat  Medical Diagnosis from Referral: M25.551,M25.552 (ICD-10-CM) - Bilateral hip pain  Evaluation Date: 1/20/2023  Authorization Period Expiration: 12/31/2023  Plan of Care Expiration: 4/20/2023  Progress Note Due: 2/20/2023  Visit # / Visits authorized: 2/ 20   FOTO: 1/3     Precautions: Standard      Time In: 1330  Time Out: 1425  Total Appointment Time (timed & untimed codes): 55 minutes       PTA Visit #: 2/5     FOTO first follow up:   FOTO second follow up:       SUBJECTIVE     Pt reports:Her hip pain is better but continues to have low back pain   She was compliant with home exercise program.  Response to previous treatment: positive   Functional change: first treat after eval     Pain: 3/10  Location: bilateral hips      OBJECTIVE     Objective Measures updated at progress report unless specified.     Treatment     Bhavna received the treatments listed below:    Bhavna, was supervised by a rehabilitation technician under the direction of the treating therapist.    manual therapy techniques: Joint mobilizations and Manual traction were applied to the: B hips and low back for 0 minutes, including:  Lumbar gapping mobs L3-L4   LAD B   Manual Lumbar distraction     therapeutic exercises to develop strength, endurance, posture, and core stabilization for 55 minutes including:  Glute med isometrics for tendinopothy 5 x 45" 2' rest between Standing hip abduction GTB   Hip extension, x 2 min    Calf raises,  x 2 min   Transverse abdominis press with 5" holds, 2 x for 2 min   HS curls seated edge of bed, x 2 min B  PPT, 3 x 10 5" holds   BKFO, 2 x " 10 GTB   Lateral walks, 3 x 10 yds GTB   Pallof press with PPT, x 15 BTB  each direction     Patient Education and Home Exercises     Home Exercises Provided and Patient Education Provided     Education provided:   - Continue HEP     Written Home Exercises Provided: Patient instructed to cont prior HEP. Exercises were reviewed and Bhavna was able to demonstrate them prior to the end of the session.  Bhavna demonstrated good  understanding of the education provided. See EMR under Patient Instructions for exercises provided during therapy sessions    ASSESSMENT     Patient presents with improved lateral hip pain and less tenderness. She continues to report low back pain that worsens with prolonged standing and activity. Pt with good tolerance to therapy session with no adverse effects reported.        Bhavna Is progressing well towards her goals.   Pt prognosis is Good.     Pt will continue to benefit from skilled outpatient physical therapy to address the deficits listed in the problem list box on initial evaluation, provide pt/family education and to maximize pt's level of independence in the home and community environment.     Pt's spiritual, cultural and educational needs considered and pt agreeable to plan of care and goals.     Anticipated barriers to physical therapy: none    Goals: Physical therapist creating goals    PLAN     Continue to treat and progress per POC and pt tolerance         Kulwant Wright, PTA

## 2023-01-30 ENCOUNTER — CLINICAL SUPPORT (OUTPATIENT)
Dept: REHABILITATION | Facility: HOSPITAL | Age: 38
End: 2023-01-30
Payer: MEDICAID

## 2023-01-30 DIAGNOSIS — G89.29 CHRONIC HIP PAIN, BILATERAL: ICD-10-CM

## 2023-01-30 DIAGNOSIS — M25.551 CHRONIC HIP PAIN, BILATERAL: ICD-10-CM

## 2023-01-30 DIAGNOSIS — M25.552 CHRONIC HIP PAIN, BILATERAL: ICD-10-CM

## 2023-01-30 DIAGNOSIS — R29.898 DECREASED STRENGTH OF LOWER EXTREMITY: Primary | ICD-10-CM

## 2023-01-30 PROCEDURE — 97110 THERAPEUTIC EXERCISES: CPT | Mod: PN

## 2023-01-30 NOTE — PROGRESS NOTES
[facility]   Physical Therapy Treatment Note     [facility]  Clinic Number: [id number]    Therapy Diagnosis:   Encounter Diagnoses   Name Primary?    Decreased strength of lower extremity Yes    Chronic hip pain, bilateral      Physician: Julieta García FNP-C    Visit Date: 1/30/2023       Physician Orders: PT Eval and Treat  Medical Diagnosis from Referral: M25.551,M25.552 (ICD-10-CM) - Bilateral hip pain  Evaluation Date: 1/20/2023  Authorization Period Expiration: 12/31/2023  Plan of Care Expiration: 4/20/2023  Progress Note Due: 2/20/2023  Visit # / Visits authorized: 3 / 20   FOTO: 1/3     Precautions: Standard      Time In: 1200  Time Out: 1245  Total Appointment Time (timed & untimed codes): 45 minutes       PTA Visit #: 0/5     FOTO first follow up:   FOTO second follow up:       SUBJECTIVE     Pt reports: her back pain has been a little worse. Hip pain continues. Reports pain when she is lifting things and when she sits up straight.     She was compliant with home exercise program.  Response to previous treatment: positive   Functional change: none reported     Pain: 7/10 R, 5/10 L, 5/10 back   Location: bilateral hips      OBJECTIVE     Objective Measures updated at progress report unless specified.     Repeated movement testing:   Pain with repeated prone press ups and DKTC    Treatment     Bhavna received the treatments listed below:      manual therapy techniques: Joint mobilizations and Manual traction were applied to the: B hips and low back for 0 minutes, including:    Lumbar gapping mobs L3-L4   LAD B   Manual Lumbar distraction     therapeutic exercises to develop strength, endurance, posture, and core stabilization for 45 minutes including:    Prone press ups 20x    Pain with these so attempted overpressure above and below but reported no change   DKTC 10 x 10 sec   Pain with this as well, no better   Attempted PPT 15x   Ceased 2/2 reports of  "bothering her back   Hooklying hip abduction with belt 5 x 45 sec for tendinopathy   SL clams red TB 5 x 30 sec BLE for tendinopathy   Prone static lumbar extension with head of mat elevated x 3 min   Increased pain   Prone hip extensions 2 x 10   No pain reported   Prone alt UE lifts 1 x 15 reps  Second set not performed 2/2 pt reporting "feels like a knife stabbing me in the back"    Standing Glute med isometrics for tendinopothy green TB at ankles 4 x 45"    NP:  Hip extension, x 2 min    Calf raises,  x 2 min   Transverse abdominis press with 5" holds, 2 x for 2 min   HS curls seated edge of bed, x 2 min B  PPT, 3 x 10 5" holds   BKFO, 2 x 10 GTB   Lateral walks, 3 x 10 yds GTB   Pallof press with PPT, x 15 BTB  each direction     Patient Education and Home Exercises     Home Exercises Provided and Patient Education Provided     Education provided:   - Continue HEP     Written Home Exercises Provided: Patient instructed to cont prior HEP. Exercises were reviewed and Bhavna was able to demonstrate them prior to the end of the session.  Bhavna demonstrated good  understanding of the education provided. See EMR under Patient Instructions for exercises provided during therapy sessions    ASSESSMENT     Arrived with increased complaints of low back and hip pain. R hip is now bothering her more than the L side. Started tx session with assessing low back pain to assess if patient has a direction movement preference. No preference found in which she reported worsening of pain with prone press ups and DKTC as well as standing flexion and extension. Attempted PPT for core and abdominal bracing but she also reported more pain with this. She denied back pain with prone hip extension but had sharp pain with alt UE lifts. I believe her low back pain is due to decreased stability with core and trunk weakness. She had hip pain with most isometrics R more than L. She reported no change in back pain post tx session and soreness in " hips.     Bhavna Is progressing well towards her goals.   Pt prognosis is Good.     Pt will continue to benefit from skilled outpatient physical therapy to address the deficits listed in the problem list box on initial evaluation, provide pt/family education and to maximize pt's level of independence in the home and community environment.     Pt's spiritual, cultural and educational needs considered and pt agreeable to plan of care and goals.     Anticipated barriers to physical therapy: none    Goals:     STG  Weeks/Visits Date Established  Goal Status    1.Patient will report </= 2/10 current pain and </= 5/10 for at worst pain over the past week to improve quality of life  3 weeks/  6 visits  1/20/2023    Progressing 1/30/2023     2. Patient will perform SLS for >/= 15 sec without pain to improve community mobility  3 weeks/  6 visits  1/20/2023    Progressing 1/30/2023     3. Patient will ambulate 1 flight of stairs with </= 3/10 pain to improve functional mobility  3 weeks/  6 visits  1/20/2023    Progressing 1/30/2023     4.Patient will report decreased tenderness to palpation to improve ability to lay on her sides  3 weeks/  6 visits  1/20/2023    Progressing 1/30/2023     5.Patient will report </= 55% limitation on FOTO to improve return to PLOF  3 weeks/  6 visits  1/20/2023    Progressing 1/30/2023        LTG Weeks/Visits Date Established  Goal Status    1.Patient will report 0/10 current pain and </= 3/10 for at worst pain over the past week to improve quality of life  6 weeks/ 12 visits  1/20/2023        2. Patient will perform SLS for >/= 30 sec without pain to improve community mobility  6 weeks/ 12 visits  1/20/2023           3.Patient will ambulate 1 flight of stairs with </= 1/10 pain to improve functional mobility  6 weeks/ 12 visits  1/20/2023        4.Patient will report </= 48% limitation on FOTO to improve return to PLOF  6 weeks/ 12 visits  1/20/2023        5.Patient will perform hip AROM  without pain to improve return to cutting grass 6 weeks/ 12 visits  1/20/2023                   PLAN     Continue to treat and progress per POC and pt tolerance     Carla Valentin, PT

## 2023-02-02 ENCOUNTER — CLINICAL SUPPORT (OUTPATIENT)
Dept: REHABILITATION | Facility: HOSPITAL | Age: 38
End: 2023-02-02
Payer: MEDICAID

## 2023-02-02 DIAGNOSIS — R29.898 DECREASED STRENGTH OF LOWER EXTREMITY: Primary | ICD-10-CM

## 2023-02-02 DIAGNOSIS — M25.551 CHRONIC HIP PAIN, BILATERAL: ICD-10-CM

## 2023-02-02 DIAGNOSIS — M25.552 CHRONIC HIP PAIN, BILATERAL: ICD-10-CM

## 2023-02-02 DIAGNOSIS — G89.29 CHRONIC HIP PAIN, BILATERAL: ICD-10-CM

## 2023-02-02 PROCEDURE — 97110 THERAPEUTIC EXERCISES: CPT | Mod: PN,CQ

## 2023-02-02 NOTE — PROGRESS NOTES
"[facility]   Physical Therapy Treatment Note     Name: Bhavna Heart  Clinic Number: [id number]    Therapy Diagnosis:   Encounter Diagnoses   Name Primary?    Decreased strength of lower extremity Yes    Chronic hip pain, bilateral        Physician: Julieta García FNP-C    Visit Date: 2/2/2023       Physician Orders: PT Eval and Treat  Medical Diagnosis from Referral: M25.551,M25.552 (ICD-10-CM) - Bilateral hip pain  Evaluation Date: 1/20/2023  Authorization Period Expiration: 12/31/2023  Plan of Care Expiration: 4/20/2023  Progress Note Due: 2/20/2023  Visit # / Visits authorized: 4 / 20   FOTO: 1/3     Precautions: Standard      Time In: 1300  Time Out: 1355  Total Appointment Time (timed & untimed codes): 55 minutes       PTA Visit #: 1/5     FOTO first follow up:   FOTO second follow up:       SUBJECTIVE     Pt reports: her back pain has been a little worse. Hip pain continues. Reports pain when she is lifting things and when she sits up straight.     She was compliant with home exercise program.  Response to previous treatment: positive   Functional change: none reported     Pain: 7/10 R, 5/10 L, 5/10 back   Location: bilateral hips      OBJECTIVE     Objective Measures updated at progress report unless specified.     Repeated movement testing:   Pain with repeated prone press ups and DKTC    Treatment    Bhavan, was supervised by a rehabilitation technician under the direction of the treating therapist.    Bhavna received the treatments listed below:      manual therapy techniques: Joint mobilizations and Manual traction were applied to the: B hips and low back for 0 minutes, including:    Lumbar gapping mobs L3-L4   LAD B   Manual Lumbar distraction     therapeutic exercises to develop strength, endurance, posture, and core stabilization for 55 minutes including:    Standing Glute med isometrics for tendinopothy green TB at ankles 4 x 45"  Hip extension, x 2 min    Calf " "raises,  x 2 min   Transverse abdominis press with 5" holds, 2 x for 2 min   HS curls seated edge of bed, x 2 min B  PPT, 3 x 10 5" holds   BKFO, 2 x 10 GTB   Lateral walks, 3 x 10 yds GTB   Pallof press with PPT, x 15 BTB  each direction     Patient Education and Home Exercises     Home Exercises Provided and Patient Education Provided     Education provided:   - Continue HEP     Written Home Exercises Provided: Patient instructed to cont prior HEP. Exercises were reviewed and Bhavna was able to demonstrate them prior to the end of the session.  Bhavna demonstrated good  understanding of the education provided. See EMR under Patient Instructions for exercises provided during therapy sessions    ASSESSMENT     Patient continued with c/o low back pain with majority of exercises. Transverse abdominis sets in supine did not worsen her pain but also did not improve her pain levels. Glute med isometrics performed just on R side today as this is the side that she stated was still bothering her. Pt with fair tolerance to therapy session with no adverse effects reported.        Bhavna Is progressing well towards her goals.   Pt prognosis is Good.     Pt will continue to benefit from skilled outpatient physical therapy to address the deficits listed in the problem list box on initial evaluation, provide pt/family education and to maximize pt's level of independence in the home and community environment.     Pt's spiritual, cultural and educational needs considered and pt agreeable to plan of care and goals.     Anticipated barriers to physical therapy: none    Goals:     STG  Weeks/Visits Date Established  Goal Status    1.Patient will report </= 2/10 current pain and </= 5/10 for at worst pain over the past week to improve quality of life  3 weeks/  6 visits  1/20/2023    Progressing 2/2/2023     2. Patient will perform SLS for >/= 15 sec without pain to improve community mobility  3 weeks/  6 visits  1/20/2023    Progressing " 2/2/2023     3. Patient will ambulate 1 flight of stairs with </= 3/10 pain to improve functional mobility  3 weeks/  6 visits  1/20/2023    Progressing 2/2/2023     4.Patient will report decreased tenderness to palpation to improve ability to lay on her sides  3 weeks/  6 visits  1/20/2023    Progressing 2/2/2023     5.Patient will report </= 55% limitation on FOTO to improve return to PLOF  3 weeks/  6 visits  1/20/2023    Progressing 2/2/2023        LTG Weeks/Visits Date Established  Goal Status    1.Patient will report 0/10 current pain and </= 3/10 for at worst pain over the past week to improve quality of life  6 weeks/ 12 visits  1/20/2023        2. 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

## 2023-02-06 ENCOUNTER — CLINICAL SUPPORT (OUTPATIENT)
Dept: REHABILITATION | Facility: HOSPITAL | Age: 38
End: 2023-02-06
Payer: MEDICAID

## 2023-02-06 DIAGNOSIS — R29.898 DECREASED STRENGTH OF LOWER EXTREMITY: Primary | ICD-10-CM

## 2023-02-06 DIAGNOSIS — M25.552 CHRONIC HIP PAIN, BILATERAL: ICD-10-CM

## 2023-02-06 DIAGNOSIS — M25.551 CHRONIC HIP PAIN, BILATERAL: ICD-10-CM

## 2023-02-06 DIAGNOSIS — G89.29 CHRONIC HIP PAIN, BILATERAL: ICD-10-CM

## 2023-02-06 PROCEDURE — 97110 THERAPEUTIC EXERCISES: CPT | Mod: PN,CQ

## 2023-02-06 NOTE — PROGRESS NOTES
"[facility]   Physical Therapy Treatment Note     Name: Bhavna Heart  Clinic Number: [id number]    Therapy Diagnosis:   Encounter Diagnoses   Name Primary?    Decreased strength of lower extremity Yes    Chronic hip pain, bilateral        Physician: Julieta García FNP-C    Visit Date: 2/6/2023       Physician Orders: PT Eval and Treat  Medical Diagnosis from Referral: M25.551,M25.552 (ICD-10-CM) - Bilateral hip pain  Evaluation Date: 1/20/2023  Authorization Period Expiration: 12/31/2023  Plan of Care Expiration: 4/20/2023  Progress Note Due: 2/20/2023  Visit # / Visits authorized: 4 / 20   FOTO: 1/3     Precautions: Standard      Time In: 1305  Time Out: 1400  Total Appointment Time (timed & untimed codes): 55 minutes       PTA Visit #: 2/5     FOTO first follow up:   FOTO second follow up:       SUBJECTIVE     Pt reports: Similar reports of pain[facility] was [facility] on her feet a lot     She was compliant with home exercise program.  Response to previous treatment: positive   Functional change: none reported     Pain: 7/10 R, 5/10 L, 5/10 back   Location: bilateral hips      OBJECTIVE     Objective Measures updated at progress report unless specified.     Repeated movement testing:   Pain with repeated prone press ups and DKTC    Treatment    Bhavna, was supervised by a rehabilitation technician under the direction of the treating therapist.    Bhavna received the treatments listed below:      manual therapy techniques: Joint mobilizations and Manual traction were applied to the: B hips and low back for 0 minutes, including:    Lumbar gapping mobs L3-L4   LAD B   Manual Lumbar distraction     therapeutic exercises to develop strength, endurance, posture, and core stabilization for 55 minutes including:    Standing Glute med isometrics for tendinopothy green TB at ankles 4 x 45"  Hip extension, x 2 min    Calf raises,  x 2 min   Transverse abdominis " "press with 5" holds, 2 x for 2 min   HS curls seated edge of bed, x 2 min B  PPT, 3 x 10 5" holds   BKFO, 2 x 10 GTB   Lateral walks, 3 x 10 yds GTB   Pallof press with PPT, x 15 BTB  each direction     Patient Education and Home Exercises     Home Exercises Provided and Patient Education Provided     Education provided:   - Continue HEP     Written Home Exercises Provided: Patient instructed to cont prior HEP. Exercises were reviewed and Bhavna was able to demonstrate them prior to the end of the session.  Bhavna demonstrated good  understanding of the education provided. See EMR under Patient Instructions for exercises provided during therapy sessions    ASSESSMENT     Patient continues with lordotic lumbar posture requiring vc to maintain lumbar in a neutral position wit cat cow exercise.R hip continues to be painful when she is up on her feet for extended times. Pt with good tolerance to therapy session with no adverse effects reported.            Bhavna Is progressing well towards her goals.   Pt prognosis is Good.     Pt will continue to benefit from skilled outpatient physical therapy to address the deficits listed in the problem list box on initial evaluation, provide pt/family education and to maximize pt's level of independence in the home and community environment.     Pt's spiritual, cultural and educational needs considered and pt agreeable to plan of care and goals.     Anticipated barriers to physical therapy: none    Goals:     STG  Weeks/Visits Date Established  Goal Status    1.Patient will report </= 2/10 current pain and </= 5/10 for at worst pain over the past week to improve quality of life  3 weeks/  6 visits  1/20/2023    Progressing 2/6/2023     2. Patient will perform SLS for >/= 15 sec without pain to improve community mobility  3 weeks/  6 visits  1/20/2023    Progressing 2/6/2023     3. Patient will ambulate 1 flight of stairs with </= 3/10 pain to improve functional mobility  3 weeks/  6 " visits  1/20/2023    Progressing 2/6/2023     4.Patient will report decreased tenderness to palpation to improve ability to lay on her sides  3 weeks/  6 visits  1/20/2023    Progressing 2/6/2023     5.Patient will report </= 55% limitation on FOTO to improve return to PLOF  3 weeks/  6 visits  1/20/2023    Progressing 2/6/2023        LTG Weeks/Visits Date Established  Goal Status    1.Patient will report 0/10 current pain and </= 3/10 for at worst pain over the past week to improve quality of life  6 weeks/ 12 visits  1/20/2023        2. 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

## 2023-02-09 ENCOUNTER — CLINICAL SUPPORT (OUTPATIENT)
Dept: REHABILITATION | Facility: HOSPITAL | Age: 38
End: 2023-02-09
Payer: MEDICAID

## 2023-02-09 DIAGNOSIS — M25.551 CHRONIC HIP PAIN, BILATERAL: ICD-10-CM

## 2023-02-09 DIAGNOSIS — M25.552 CHRONIC HIP PAIN, BILATERAL: ICD-10-CM

## 2023-02-09 DIAGNOSIS — G89.29 CHRONIC HIP PAIN, BILATERAL: ICD-10-CM

## 2023-02-09 DIAGNOSIS — R29.898 DECREASED STRENGTH OF LOWER EXTREMITY: Primary | ICD-10-CM

## 2023-02-09 PROCEDURE — 97110 THERAPEUTIC EXERCISES: CPT | Mod: PN,CQ

## 2023-02-09 NOTE — PROGRESS NOTES
"[facility]   Physical Therapy Treatment Note     Name: Bhavna Heart  Clinic Number: [id number]    Therapy Diagnosis:   Encounter Diagnoses   Name Primary?    Decreased strength of lower extremity Yes    Chronic hip pain, bilateral        Physician: Julieta García FNP-C    Visit Date: 2/9/2023       Physician Orders: PT Eval and Treat  Medical Diagnosis from Referral: M25.551,M25.552 (ICD-10-CM) - Bilateral hip pain  Evaluation Date: 1/20/2023  Authorization Period Expiration: 12/31/2023  Plan of Care Expiration: 4/20/2023  Progress Note Due: 2/20/2023  Visit # / Visits authorized: 4 / 20   FOTO: 1/3     Precautions: Standard      Time In: 1300  Time Out: 1400  Total Appointment Time (timed & untimed codes): 60 minutes       PTA Visit #: 2/5     FOTO first follow up:   FOTO second follow up:       SUBJECTIVE     Pt reports: Similar reports of pain. Went to the parades this weekend and was up on her feet a lot     She was compliant with home exercise program.  Response to previous treatment: positive   Functional change: none reported     Pain: 7/10 R, 5/10 L, 5/10 back   Location: bilateral hips      OBJECTIVE     Objective Measures updated at progress report unless specified.     Repeated movement testing:   Pain with repeated prone press ups and DKTC    Treatment    Bhavna, was supervised by a rehabilitation technician under the direction of the treating therapist.    Bhavna received the treatments listed below:      manual therapy techniques: Joint mobilizations and Manual traction were applied to the: B hips and low back for 0 minutes, including:    Lumbar gapping mobs L3-L4   LAD B   Manual Lumbar distraction     therapeutic exercises to develop strength, endurance, posture, and core stabilization for 60 minutes including:    Side lying Glute med isometrics for tendinopothy 4 x 45"  Hip extension, x 2 min    Calf raises,  x 2 min   Transverse abdominis press with 5" " "holds, 2 x for 2 min   Donkey kicks, x 2 min B 25#  PPT with march, 3 x 10 5" holds   BKFO, 3 x 10 BTB   Lateral walks, 3 x 10 yds GTB   Pallof press, x 20 BTB B  Supine glute sets, 3 x 10 5'' hold  Prone mid trap ll, 3 x 10  Prone low trap ll, 3 x 10  Side lying hip ER hanging off table, 3 x 10 3# B  Bird dog in quadruped, 2 x 10 alt LE  PPT with shoulder extension, x 10 GTB    Patient Education and Home Exercises     Home Exercises Provided and Patient Education Provided     Education provided:   - Continue HEP     Written Home Exercises Provided: Patient instructed to cont prior HEP. Exercises were reviewed and Bhavna was able to demonstrate them prior to the end of the session.  Bhavna demonstrated good  understanding of the education provided. See EMR under Patient Instructions for exercises provided during therapy sessions    ASSESSMENT     Patient with improved subjective reports this date. Her L hip pain had improved and her R hip is steadily improving; isometric series completed on R side this date. Progressed in core stability and hip strengthening exercise with good tolerance.           Bhavna Is progressing well towards her goals.   Pt prognosis is Good.     Pt will continue to benefit from skilled outpatient physical therapy to address the deficits listed in the problem list box on initial evaluation, provide pt/family education and to maximize pt's level of independence in the home and community environment.     Pt's spiritual, cultural and educational needs considered and pt agreeable to plan of care and goals.     Anticipated barriers to physical therapy: none    Goals:     STG  Weeks/Visits Date Established  Goal Status    1.Patient will report </= 2/10 current pain and </= 5/10 for at worst pain over the past week to improve quality of life  3 weeks/  6 visits  1/20/2023    Progressing 2/9/2023     2. Patient will perform SLS for >/= 15 sec without pain to improve community mobility  3 weeks/  6 " visits  1/20/2023    Progressing 2/9/2023     3. Patient will ambulate 1 flight of stairs with </= 3/10 pain to improve functional mobility  3 weeks/  6 visits  1/20/2023    Progressing 2/9/2023     4.Patient will report decreased tenderness to palpation to improve ability to lay on her sides  3 weeks/  6 visits  1/20/2023    Progressing 2/9/2023     5.Patient will report </= 55% limitation on FOTO to improve return to PLOF  3 weeks/  6 visits  1/20/2023    Progressing 2/9/2023        LTG Weeks/Visits Date Established  Goal Status    1.Patient will report 0/10 current pain and </= 3/10 for at worst pain over the past week to improve quality of life  6 weeks/ 12 visits  1/20/2023        2. 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

## 2023-02-16 ENCOUNTER — CLINICAL SUPPORT (OUTPATIENT)
Dept: REHABILITATION | Facility: HOSPITAL | Age: 38
End: 2023-02-16
Payer: MEDICAID

## 2023-02-16 DIAGNOSIS — M25.551 CHRONIC HIP PAIN, BILATERAL: ICD-10-CM

## 2023-02-16 DIAGNOSIS — M25.552 CHRONIC HIP PAIN, BILATERAL: ICD-10-CM

## 2023-02-16 DIAGNOSIS — R29.898 DECREASED STRENGTH OF LOWER EXTREMITY: Primary | ICD-10-CM

## 2023-02-16 DIAGNOSIS — G89.29 CHRONIC HIP PAIN, BILATERAL: ICD-10-CM

## 2023-02-16 PROCEDURE — 97110 THERAPEUTIC EXERCISES: CPT | Mod: PN,CQ

## 2023-02-16 NOTE — PROGRESS NOTES
"[facility]   Physical Therapy Treatment Note     Name: Bhavna Heart  Clinic Number: [id number]    Therapy Diagnosis:   Encounter Diagnoses   Name Primary?    Decreased strength of lower extremity Yes    Chronic hip pain, bilateral        Physician: Julieta García FNP-C    Visit Date: 2/16/2023       Physician Orders: PT Eval and Treat  Medical Diagnosis from Referral: M25.551,M25.552 (ICD-10-CM) - Bilateral hip pain  Evaluation Date: 1/20/2023  Authorization Period Expiration: 12/31/2023  Plan of Care Expiration: 4/20/2023  Progress Note Due: 2/20/2023  Visit # / Visits authorized: 4 / 20   FOTO: 1/3     Precautions: Standard      Time In: 1300  Time Out: 1400  Total Appointment Time (timed & untimed codes): 60 minutes       PTA Visit #: 2/5     FOTO first follow up:   FOTO second follow up:       SUBJECTIVE     Pt reports: Continued reports of hip pain R>L     She was compliant with home exercise program.  Response to previous treatment: positive   Functional change: none reported     Pain: 3-4/10 R, 2-3/10 L, 5-6/10 back   Location: bilateral hips      OBJECTIVE     Objective Measures updated at progress report unless specified.     Repeated movement testing:   Pain with repeated prone press ups and DKTC    Treatment    Bhavna, was supervised by a rehabilitation technician under the direction of the treating therapist.    Bhavna received the treatments listed below:      manual therapy techniques: Joint mobilizations and Manual traction were applied to the: B hips and low back for 0 minutes, including:    Lumbar gapping mobs L3-L4   LAD B   Manual Lumbar distraction     therapeutic exercises to develop strength, endurance, posture, and core stabilization for 60 minutes including:    Side lying Glute med isometrics for tendinopothy 4 x 45"  Hip extension, x 2 min    Calf raises,  x 2 min   Transverse abdominis press with 5" holds, 2 x for 2 min   Donkey kicks, x 2 min B " "25#  PPT with march, 3 x 10 5" holds   BKFO, 3 x 10 BTB   Lateral walks, 3 x 10 yds GTB   Pallof press, x 20 BTB B  Supine glute sets, 3 x 10 5'' hold  Prone mid trap ll, 3 x 10  Prone low trap ll, 3 x 10  Side lying hip ER hanging off table, 3 x 10 3# B  Bird dog in quadruped, 2 x 10 alt LE  PPT with shoulder extension on PB, x 10 GTB    Patient Education and Home Exercises     Home Exercises Provided and Patient Education Provided     Education provided:   - Continue HEP     Written Home Exercises Provided: Patient instructed to cont prior HEP. Exercises were reviewed and Bhavna was able to demonstrate them prior to the end of the session.  Bhavna demonstrated good  understanding of the education provided. See EMR under Patient Instructions for exercises provided during therapy sessions    ASSESSMENT     Patient with improved subjective reports this date. Her L hip pain had improved and her R hip is steadily improving; isometric series completed on R side this date. Progressed in core stability and hip strengthening exercise with good tolerance.           Bhavna Is progressing well towards her goals.   Pt prognosis is Good.     Pt will continue to benefit from skilled outpatient physical therapy to address the deficits listed in the problem list box on initial evaluation, provide pt/family education and to maximize pt's level of independence in the home and community environment.     Pt's spiritual, cultural and educational needs considered and pt agreeable to plan of care and goals.     Anticipated barriers to physical therapy: none    Goals:     STG  Weeks/Visits Date Established  Goal Status    1.Patient will report </= 2/10 current pain and </= 5/10 for at worst pain over the past week to improve quality of life  3 weeks/  6 visits  1/20/2023    Progressing 2/16/2023     2. Patient will perform SLS for >/= 15 sec without pain to improve community mobility  3 weeks/  6 visits  1/20/2023    Progressing 2/16/2023   "   3. Patient will ambulate 1 flight of stairs with </= 3/10 pain to improve functional mobility  3 weeks/  6 visits  1/20/2023    Progressing 2/16/2023     4.Patient will report decreased tenderness to palpation to improve ability to lay on her sides  3 weeks/  6 visits  1/20/2023    Progressing 2/16/2023     5.Patient will report </= 55% limitation on FOTO to improve return to PLOF  3 weeks/  6 visits  1/20/2023    Progressing 2/16/2023        LTG Weeks/Visits Date Established  Goal Status    1.Patient will report 0/10 current pain and </= 3/10 for at worst pain over the past week to improve quality of life  6 weeks/ 12 visits  1/20/2023        2. Patient will perform SLS for >/= 30 sec without pain to improve community mobility  6 weeks/ 12 visits  1/20/2023           3.Patient will ambulate 1 flight of stairs with </= 1/10 pain to improve functional mobility  6 weeks/ 12 visits  1/20/2023        4.Patient will report </= 48% limitation on FOTO to improve return to PLOF  6 weeks/ 12 visits  1/20/2023        5.Patient will perform hip AROM without pain to improve return to cutting grass 6 weeks/ 12 visits  1/20/2023                   PLAN     Continue to treat and progress per POC and pt tolerance     Kulwant Wright PTA

## 2023-02-27 ENCOUNTER — CLINICAL SUPPORT (OUTPATIENT)
Dept: REHABILITATION | Facility: HOSPITAL | Age: 38
End: 2023-02-27
Payer: MEDICAID

## 2023-02-27 DIAGNOSIS — M25.552 CHRONIC HIP PAIN, BILATERAL: ICD-10-CM

## 2023-02-27 DIAGNOSIS — R29.898 DECREASED STRENGTH OF LOWER EXTREMITY: Primary | ICD-10-CM

## 2023-02-27 DIAGNOSIS — M25.551 CHRONIC HIP PAIN, BILATERAL: ICD-10-CM

## 2023-02-27 DIAGNOSIS — G89.29 CHRONIC HIP PAIN, BILATERAL: ICD-10-CM

## 2023-02-27 PROCEDURE — 97110 THERAPEUTIC EXERCISES: CPT | Mod: PN

## 2023-02-27 NOTE — PROGRESS NOTES
"[facility]   Physical Therapy Treatment Note     Name: Bhavna Heart  Clinic Number: [id number]    Therapy Diagnosis:   Encounter Diagnoses   Name Primary?    Decreased strength of lower extremity Yes    Chronic hip pain, bilateral      Physician: Julieta García FNP-C    Visit Date: 2/27/2023       Physician Orders: PT Eval and Treat  Medical Diagnosis from Referral: M25.551,M25.552 (ICD-10-CM) - Bilateral hip pain  Evaluation Date: 1/20/2023  Authorization Period Expiration: 12/31/2023  Plan of Care Expiration: 4/20/2023  Progress Note Due: 2/20/2023  Visit # / Visits authorized: 8 / 20   FOTO: 1/3     Precautions: Standard      Time In: 1255  Time Out: 1340  Total Appointment Time (timed & untimed codes): 45 minutes     PTA Visit #: 0     FOTO first follow up: 61  FOTO second follow up: 59      SUBJECTIVE     Pt reports: R hip continues to bother her but the L really has not bothered her lately. Reports about 80-90% improvement on the L side. Reports the R hip seems like it wants to pop out. Reports intermittent R side and low back pain. Reports sharp pain when she is twisting on the R side. Pain in low back with lifting and standing for long periods.      Yesterday she was holding her daughters hand and she pulled patients arm down leading to rotation and torque on R hip and low back "because she is goofy". Reports 8/10 when this occurred and on mardi gras break.     Reports hip isometrics at home help when her hips bother her.     She was compliant with home exercise program.  Response to previous treatment: positive   Functional change: none reported     Pain: 3/10 for R hip and low back region   Location: bilateral hips      OBJECTIVE     Objective Measures updated at progress report unless specified.     Stair navigation: R hip pain with C sign, 4-5/10 reported  SLS: > 15 sec no pain bilaterally  Hinge point with prone press up and standing extensions   Neg for " "improvement with stabilization added   Neg for improvement with PA above hypermobile segment  Limited thoracic rotation: R> L and extension   59/100 on FOTO     Treatment     Bhavna received the treatments listed below:      manual therapy techniques: Joint mobilizations and Manual traction were applied to the: B hips and low back for 0 minutes, including:    Lumbar gapping mobs L3-L4   LAD B   Manual Lumbar distraction     therapeutic exercises to develop strength, endurance, posture, and core stabilization for 45 minutes including:    Supine PPT with alt marches 10x each LE  Standing isometric hip hike RLE only 4 x 45 sec   Standing hip hikes 2 x 15 reps each side   Standing SLS with hip ABD GTB 3 x 10 reps each side   Standing SLS ball toss trampoline 15x each side red ball   Standing lateral step down with ABD band to prevent knee valgus 3 x 10 red TB  Prone alt UE lifts 5 reps each side, ceased 2/2 low back pain   Prone LE lifts ceased 2/2 reported back pain  Prone alt UE lifts 1 x 15 reps   Seated thoracic extensions 20x 5 sec   Thoracic rotations with lumbar spine locked out 10x each side     NP:  Side lying Glute med isometrics for tendinopothy 4 x 45"  Hip extension, x 2 min    Calf raises,  x 2 min   Transverse abdominis press with 5" holds, 2 x for 2 min   Donkey kicks, x 2 min B 25#  PPT with march, 3 x 10 5" holds   BKFO, 3 x 10 BTB   Lateral walks, 3 x 10 yds GTB   Pallof press, x 20 BTB B  Supine glute sets, 3 x 10 5'' hold  Prone mid trap ll, 3 x 10  Prone low trap ll, 3 x 10  Side lying hip ER hanging off table, 3 x 10 3# B  Bird dog in quadruped, 2 x 10 alt LE  PPT with shoulder extension on PB, x 10 GTB    Patient Education and Home Exercises     Home Exercises Provided and Patient Education Provided     Education provided:   - Continue HEP     Written Home Exercises Provided: Patient instructed to cont prior HEP. Exercises were reviewed and Bhavna was able to demonstrate them prior to the end of " the session.  Bhavna demonstrated good  understanding of the education provided. See EMR under Patient Instructions for exercises provided during therapy sessions    ASSESSMENT     Patient continues to report improvement in L hip pain with almost total relief. R hip continues to bother her but not as much reporting improvement here as well. Low back continues to bother her with lifting and with increased activity. Based on assessment, low back pain is likely due to decreased stability and decreased thoracic mobility. Added thoracic mobility exercises today to improve limited thoracic mobility. Performed some isotonic exercises today for abductor strengthening with good tolerance. She demonstrate significant R knee valgus and internal rotation of femur with standing balance on RLE compared to LLE with ball toss. HEP updated to include thoracic mobility exercises and hip isotonic exercises. She will continue to benefit from skilled Physical Therapist services to improve hip and low back pain to maximize functional mobility.     Bhavna Is progressing well towards her goals.   Pt prognosis is Good.     Pt will continue to benefit from skilled outpatient physical therapy to address the deficits listed in the problem list box on initial evaluation, provide pt/family education and to maximize pt's level of independence in the home and community environment.     Pt's spiritual, cultural and educational needs considered and pt agreeable to plan of care and goals.     Anticipated barriers to physical therapy: none    Goals:     STG  Weeks/Visits Date Established  Goal Status    1.Patient will report </= 2/10 current pain and </= 5/10 for at worst pain over the past week to improve quality of life  3 weeks/  6 visits  1/20/2023    Progressing 2/27/2023     2. Patient will perform SLS for >/= 15 sec without pain to improve community mobility  3 weeks/  6 visits  1/20/2023    MET 2/27/2023     3. Patient will ambulate 1 flight of  stairs with </= 3/10 pain to improve functional mobility  3 weeks/  6 visits  1/20/2023    Reports 4-5/10 pain when she is doing the stairs     4.Patient will report decreased tenderness to palpation to improve ability to lay on her sides  3 weeks/  6 visits  1/20/2023    Not assessed 2/27/2023     5.Patient will report </= 55% limitation on FOTO to improve return to PLOF  3 weeks/  6 visits  1/20/2023    Progressing 2/27/2023        LTG Weeks/Visits Date Established  Goal Status    1.Patient will report 0/10 current pain and </= 3/10 for at worst pain over the past week to improve quality of life  6 weeks/ 12 visits  1/20/2023    Progressing 2/27/2023     2. Patient will perform SLS for >/= 30 sec without pain to improve community mobility  6 weeks/ 12 visits  1/20/2023       Progressing 2/27/2023     3.Patient will ambulate 1 flight of stairs with </= 1/10 pain to improve functional mobility  6 weeks/ 12 visits  1/20/2023    Progressing 2/27/2023     4.Patient will report </= 48% limitation on FOTO to improve return to PLOF  6 weeks/ 12 visits  1/20/2023    MET 2/27/2023     5.Patient will perform hip AROM without pain to improve return to cutting grass 6 weeks/ 12 visits  1/20/2023    Progressing 2/27/2023          PLAN     Continue to treat and progress per POC and pt tolerance     Carla Valentin, PT

## 2023-03-07 ENCOUNTER — CLINICAL SUPPORT (OUTPATIENT)
Dept: REHABILITATION | Facility: HOSPITAL | Age: 38
End: 2023-03-07
Payer: MEDICAID

## 2023-03-07 DIAGNOSIS — M25.552 CHRONIC HIP PAIN, BILATERAL: ICD-10-CM

## 2023-03-07 DIAGNOSIS — R29.898 DECREASED STRENGTH OF LOWER EXTREMITY: Primary | ICD-10-CM

## 2023-03-07 DIAGNOSIS — M25.551 CHRONIC HIP PAIN, BILATERAL: ICD-10-CM

## 2023-03-07 DIAGNOSIS — G89.29 CHRONIC HIP PAIN, BILATERAL: ICD-10-CM

## 2023-03-07 PROCEDURE — 97110 THERAPEUTIC EXERCISES: CPT | Mod: PN,CQ

## 2023-03-07 NOTE — PROGRESS NOTES
[facility]   Physical Therapy Treatment Note     Name[facility]  Clinic Number: [id number]    Therapy Diagnosis:   Encounter Diagnoses   Name Primary?    Decreased strength of lower extremity Yes    Chronic hip pain, bilateral      Physician: Julieta García FNP-C    Visit Date: 3/7/2023       Physician Orders: PT Eval and Treat  Medical Diagnosis from Referral: M25.551,M25.552 (ICD-10-CM) - Bilateral hip pain  Evaluation Date: 1/20/2023  Authorization Period Expiration: 12/31/2023  Plan of Care Expiration: 4/20/2023  Progress Note Due: 2/20/2023  Visit # / Visits authorized: 0 / 20   FOTO: 1/3     Precautions: Standard      Time In: 1300  Time Out: 1400  Total Appointment Time (timed & untimed codes): 60 minutes     PTA Visit #: 1     FOTO first follow up: 61  FOTO second follow up: 59      SUBJECTIVE     Pt reports: Her back is her source of pain at the time of arrival.      She was compliant with home exercise program.  Response to previous treatment: positive   Functional change: none reported     Pain: 3/10 for R hip and low back region   Location: bilateral hips      OBJECTIVE     Objective Measures updated at progress report unless specified.     Stair navigation: R hip pain with C sign, 4-5/10 reported  SLS: > 15 sec no pain bilaterally  Hinge point with prone press up and standing extensions   Neg for improvement with stabilization added   Neg for improvement with PA above hypermobile segment  Limited thoracic rotation: R> L and extension   59/100 on FOTO     Treatment     Bhavna received the treatments listed below:      Bhavna, was supervised by a rehabilitation technician under the direction of the treating therapist.      manual therapy techniques: Joint mobilizations and Manual traction were applied to the: B hips and low back for 0 minutes, including:    Lumbar gapping mobs L3-L4   LAD B   Manual Lumbar distraction     therapeutic exercises to develop  "strength, endurance, posture, and core stabilization for 60 minutes including:  Bridges with lat pull down, 3 x 10 GTB   Step up lat pull down, 3 x 10 GTB   Clamshells, 3 x 10 ea side   Donkey kicks, 3 x 12 12# ea side   Seated lat pull downs, 3 x 10 GTB   Prone alt UE lifts 1 x 15 reps   Seated thoracic extensions 20x 5 sec   Thoracic rotations with lumbar spine locked out 10x each side       NP 2* to time:  Supine PPT with alt marches 10x each LE  Standing isometric hip hike RLE only 4 x 45 sec   Standing hip hikes 2 x 15 reps each side   Standing SLS with hip ABD GTB 3 x 10 reps each side   Standing SLS ball toss trampoline 15x each side red ball   Standing lateral step down with ABD band to prevent knee valgus 3 x 10 red TB  Prone alt UE lifts 5 reps each side, ceased 2/2 low back pain   Prone LE lifts ceased 2/2 reported back pain      Patient Education and Home Exercises     Home Exercises Provided and Patient Education Provided     Education provided:   - Continue HEP     Written Home Exercises Provided: Patient instructed to cont prior HEP. Exercises were reviewed and Bhavna was able to demonstrate them prior to the end of the session.  Bhavna demonstrated good  understanding of the education provided. See EMR under Patient Instructions for exercises provided during therapy sessions    ASSESSMENT     Patient continued to complain of low back pain distal to TL junction. Patient presents with kyphotic posture. Today patient performed strengthening exercises for posterior sling to improve lumbar and SIJ stability. Patient c/o "numb" sensation when referring to her pain yet sensation to touch is intact. Patient without complains of low back pain when leaving but patient reported that she will feel it later.     Bhavna Is progressing well towards her goals.   Pt prognosis is Good.     Pt will continue to benefit from skilled outpatient physical therapy to address the deficits listed in the problem list box on " initial evaluation, provide pt/family education and to maximize pt's level of independence in the home and community environment.     Pt's spiritual, cultural and educational needs considered and pt agreeable to plan of care and goals.     Anticipated barriers to physical therapy: none    Goals:     STG  Weeks/Visits Date Established  Goal Status    1.Patient will report </= 2/10 current pain and </= 5/10 for at worst pain over the past week to improve quality of life  3 weeks/  6 visits  1/20/2023    Progressing 3/7/2023     2. Patient will perform SLS for >/= 15 sec without pain to improve community mobility  3 weeks/  6 visits  1/20/2023    MET 2/27/2023     3. Patient will ambulate 1 flight of stairs with </= 3/10 pain to improve functional mobility  3 weeks/  6 visits  1/20/2023    Reports 4-5/10 pain when she is doing the stairs     4.Patient will report decreased tenderness to palpation to improve ability to lay on her sides  3 weeks/  6 visits  1/20/2023    Not assessed 2/27/2023     5.Patient will report </= 55% limitation on FOTO to improve return to PLOF  3 weeks/  6 visits  1/20/2023    Progressing 3/7/2023        LTG Weeks/Visits Date Established  Goal Status    1.Patient will report 0/10 current pain and </= 3/10 for at worst pain over the past week to improve quality of life  6 weeks/ 12 visits  1/20/2023    Progressing 3/7/2023     2. Patient will perform SLS for >/= 30 sec without pain to improve community mobility  6 weeks/ 12 visits  1/20/2023       Progressing 3/7/2023     3.Patient will ambulate 1 flight of stairs with </= 1/10 pain to improve functional mobility  6 weeks/ 12 visits  1/20/2023    Progressing 3/7/2023     4.Patient will report </= 48% limitation on FOTO to improve return to PLOF  6 weeks/ 12 visits  1/20/2023    MET 2/27/2023     5.Patient will perform hip AROM without pain to improve return to cutting grass 6 weeks/ 12 visits  1/20/2023    Progressing 3/7/2023          PLAN      Continue to treat and progress per POC and pt tolerance     Kulwant Wright PTA

## 2023-03-10 ENCOUNTER — CLINICAL SUPPORT (OUTPATIENT)
Dept: REHABILITATION | Facility: HOSPITAL | Age: 38
End: 2023-03-10
Payer: MEDICAID

## 2023-03-10 DIAGNOSIS — R29.898 DECREASED STRENGTH OF LOWER EXTREMITY: Primary | ICD-10-CM

## 2023-03-10 DIAGNOSIS — M25.552 CHRONIC HIP PAIN, BILATERAL: ICD-10-CM

## 2023-03-10 DIAGNOSIS — M25.551 CHRONIC HIP PAIN, BILATERAL: ICD-10-CM

## 2023-03-10 DIAGNOSIS — G89.29 CHRONIC HIP PAIN, BILATERAL: ICD-10-CM

## 2023-03-10 PROCEDURE — 97110 THERAPEUTIC EXERCISES: CPT | Mod: PN,CQ

## 2023-03-10 NOTE — PROGRESS NOTES
[facility]   Physical Therapy Treatment Note     [facility]  [facility] Number: [id number]    Therapy Diagnosis:   Encounter Diagnoses   Name Primary?    Decreased strength of lower extremity Yes    Chronic hip pain, bilateral      Physician: Julieta García FNP-C    Visit Date: 3/10/2023       Physician Orders: PT Eval and Treat  Medical Diagnosis from Referral: M25.551,M25.552 (ICD-10-CM) - Bilateral hip pain  Evaluation Date: 1/20/2023  Authorization Period Expiration: 12/31/2023  Plan of Care Expiration: 4/20/2023  Progress Note Due: 2/20/2023  Visit # / Visits authorized: 10 / 20   FOTO: 1/3     Precautions: Standard      Time In: 0930  Time Out: 1030  Total Appointment Time (timed & untimed codes): 60 minutes     PTA Visit #: 2     FOTO first follow up: 61  FOTO second follow up: 59      SUBJECTIVE     Pt reports: Her back is her source of pain at the time of arrival.      She was compliant with home exercise program.  Response to previous treatment: positive   Functional change: none reported     Pain: 3/10 for R hip and low back region   Location: bilateral hips      OBJECTIVE       Treatment     Bhavna received the treatments listed below:      Bhavna, was supervised by a rehabilitation technician under the direction of the treating therapist.      manual therapy techniques: Joint mobilizations and Manual traction were applied to the: B hips and low back for 0 minutes, including:    Lumbar gapping mobs L3-L4   LAD B   Manual Lumbar distraction     therapeutic exercises to develop strength, endurance, posture, and core stabilization for 60 minutes including:  Bridges with lat pull down, 3 x 10 GTB   Step up lat pull down, 3 x 10 GTB   Clamshells, 3 x 10 ea side   Donkey kicks, 3 x 12 12# ea side   Seated lat pull downs, 3 x 10 GTB   Prone alt UE lifts 1 x 15 reps   Seated thoracic extensions 20x 5 sec   Thoracic rotations with lumbar spine locked out 10x each  side       Bridges with lat pull down, 3 x 10 GTB   Clamshells, 3 x 10 ea side   Prone alt UE lifts 1 x 15 reps   Seated thoracic extensions 20x 5 sec   Thoracic rotations with lumbar spine locked out 10x each side    NP 2* to time:  Supine PPT with alt marches 10x each LE  Standing isometric hip hike RLE only 4 x 45 sec   Standing hip hikes 2 x 15 reps each side   Standing SLS with hip ABD GTB 3 x 10 reps each side   Standing SLS ball toss trampoline 15x each side red ball   Standing lateral step down with ABD band to prevent knee valgus 3 x 10 red TB  Prone alt UE lifts 5 reps each side, ceased 2/2 low back pain   Prone LE lifts ceased 2/2 reported back pain      Patient Education and Home Exercises     Home Exercises Provided and Patient Education Provided     Education provided:   - Continue HEP     Written Home Exercises Provided: Patient instructed to cont prior HEP. Exercises were reviewed and Bhavna was able to demonstrate them prior to the end of the session.  Bhavna demonstrated good  understanding of the education provided. See EMR under Patient Instructions for exercises provided during therapy sessions    ASSESSMENT     Patient continued to complain of low back pain distal to TL junction. Patient presents with kyphotic posture. Today patient performed strengthening exercises for posterior sling to improve lumbar and SIJ stability. HEP provided to continue strengthening posterior sling    Bhavna Is progressing well towards her goals.   Pt prognosis is Good.     Pt will continue to benefit from skilled outpatient physical therapy to address the deficits listed in the problem list box on initial evaluation, provide pt/family education and to maximize pt's level of independence in the home and community environment.     Pt's spiritual, cultural and educational needs considered and pt agreeable to plan of care and goals.     Anticipated barriers to physical therapy: none    Goals:     STG  Weeks/Visits Date  Established  Goal Status    1.Patient will report </= 2/10 current pain and </= 5/10 for at worst pain over the past week to improve quality of life  3 weeks/  6 visits  1/20/2023    Progressing 3/10/2023     2. Patient will perform SLS for >/= 15 sec without pain to improve community mobility  3 weeks/  6 visits  1/20/2023    MET 2/27/2023     3. Patient will ambulate 1 flight of stairs with </= 3/10 pain to improve functional mobility  3 weeks/  6 visits  1/20/2023    Reports 4-5/10 pain when she is doing the stairs     4.Patient will report decreased tenderness to palpation to improve ability to lay on her sides  3 weeks/  6 visits  1/20/2023    Not assessed 2/27/2023     5.Patient will report </= 55% limitation on FOTO to improve return to PLOF  3 weeks/  6 visits  1/20/2023    Progressing 3/10/2023        LTG Weeks/Visits Date Established  Goal Status    1.Patient will report 0/10 current pain and </= 3/10 for at worst pain over the past week to improve quality of life  6 weeks/ 12 visits  1/20/2023    Progressing 3/10/2023     2. Patient will perform SLS for >/= 30 sec without pain to improve community mobility  6 weeks/ 12 visits  1/20/2023       Progressing 3/10/2023     3.Patient will ambulate 1 flight of stairs with </= 1/10 pain to improve functional mobility  6 weeks/ 12 visits  1/20/2023    Progressing 3/10/2023     4.Patient will report </= 48% limitation on FOTO to improve return to PLOF  6 weeks/ 12 visits  1/20/2023    MET 2/27/2023     5.Patient will perform hip AROM without pain to improve return to cutting grass 6 weeks/ 12 visits  1/20/2023    Progressing 3/10/2023          PLAN     Continue to treat and progress per POC and pt tolerance     Kulwant Wright, RAHUL

## 2023-03-14 ENCOUNTER — CLINICAL SUPPORT (OUTPATIENT)
Dept: REHABILITATION | Facility: HOSPITAL | Age: 38
End: 2023-03-14
Payer: MEDICAID

## 2023-03-14 DIAGNOSIS — R29.898 DECREASED STRENGTH OF LOWER EXTREMITY: Primary | ICD-10-CM

## 2023-03-14 DIAGNOSIS — M25.551 CHRONIC HIP PAIN, BILATERAL: ICD-10-CM

## 2023-03-14 DIAGNOSIS — M25.552 CHRONIC HIP PAIN, BILATERAL: ICD-10-CM

## 2023-03-14 DIAGNOSIS — G89.29 CHRONIC HIP PAIN, BILATERAL: ICD-10-CM

## 2023-03-14 PROCEDURE — 97110 THERAPEUTIC EXERCISES: CPT | Mod: PN

## 2023-03-14 NOTE — PLAN OF CARE
[facility]  3/14/2023 Discharge Note    Name: Bhavna Heart  Clinic Number: [id number]    Therapy Diagnosis:   Encounter Diagnoses   Name Primary?    Decreased strength of lower extremity Yes    Chronic hip pain, bilateral      Physician: Julieta García FNP-C       Physician Orders: PT Eval and Treat  Medical Diagnosis from Referral: M25.551,M25.552 (ICD-10-CM) - Bilateral hip pain  Evaluation Date: 1/20/2023    Date of Last visit: 3/14/2023  Total Visits Received: 11    ASSESSMENT      Patient is being discharged from skilled Physical Therapist services at this time in which she reports significant improvement in B hip pain. Glut med tendinopathy protocol followed. She reports 80% improvement in B hip pain. She is able to navigate stairs without pain. She demonstrates improvements in B hip pain, B hip ROM, and B hip strength. There is also decrease in tenderness over greater trochanter since evaluation . However, she reports when she started therapy she noticed an increase in her LBP. She reports low back pain with sitting and lifting as well as with prolong standing. She verbalizes increased lordosis with standing position. Physical Therapist educated pt to engage core and posterior pelvic tilt. LBP has been assessed across multiple sessions and she has been given HEP exercises to assess effectiveness of exercises however she denies improvement. She does not demonstrate a directional preference at this time. Her low back pain seems to be due to impaired stability and core strength. Today she reported worsening of pain with supine lumbar flexion so attempted prone press ups but she also had pain with this. With prone hip extension, she reports pain but no improvement with added stability. Physical Therapist encouraged pt to perform stability exercises given in HEP to assess if this helps her low back pain and encouraged performance of extension based movement following  repeated flexion ( lifting and prolong sitting). She verbalized good understanding.     Discharge reason: Patient has reached the maximum rehab potential for the present time    Discharge FOTO Score: 76/100 = 24% limitation     Goals:       Goals:     STG  Weeks/Visits Date Established  Goal Status    1.Patient will report </= 2/10 current pain and </= 5/10 for at worst pain over the past week to improve quality of life  3 weeks/  6 visits  1/20/2023    MET 3/14/2023     2. Patient will perform SLS for >/= 15 sec without pain to improve community mobility  3 weeks/  6 visits  1/20/2023    MET 2/27/2023     3. Patient will ambulate 1 flight of stairs with </= 3/10 pain to improve functional mobility  3 weeks/  6 visits  1/20/2023    Reports 4-5/10 pain when she is doing the stairs     4.Patient will report decreased tenderness to palpation to improve ability to lay on her sides  3 weeks/  6 visits  1/20/2023    MET 3/14/2023     5.Patient will report </= 55% limitation on FOTO to improve return to PLOF  3 weeks/  6 visits  1/20/2023    MET 3/14/2023        LTG Weeks/Visits Date Established  Goal Status    1.Patient will report 0/10 current pain and </= 3/10 for at worst pain over the past week to improve quality of life  6 weeks/ 12 visits  1/20/2023    Partially met 3/14/2023     2. Patient will perform SLS for >/= 30 sec without pain to improve community mobility  6 weeks/ 12 visits  1/20/2023       Not tested      3.Patient will ambulate 1 flight of stairs with </= 1/10 pain to improve functional mobility  6 weeks/ 12 visits  1/20/2023    MET 3/14/2023     4.Patient will report </= 48% limitation on FOTO to improve return to PLOF  6 weeks/ 12 visits  1/20/2023    MET 2/27/2023     5.Patient will perform hip AROM without pain to improve return to cutting grass 6 weeks/ 12 visits  1/20/2023    MET 3/14/2023         PLAN   This patient is discharged from Physical Therapy      Carla Valentin, PT

## 2023-03-14 NOTE — PROGRESS NOTES
[facility]   Physical Therapy Treatment Note     Name: Bhavna Heart  Clinic Number: [id number]    Therapy Diagnosis:   Encounter Diagnoses   Name Primary?    Decreased strength of lower extremity Yes    Chronic hip pain, bilateral        Physician: uJlieta García FNP-C    Visit Date: 3/14/2023       Physician Orders: PT Eval and Treat  Medical Diagnosis from Referral: M25.551,M25.552 (ICD-10-CM) - Bilateral hip pain  Evaluation Date: 1/20/2023  Authorization Period Expiration: 12/31/2023  Plan of Care Expiration: 4/20/2023  Progress Note Due: 2/20/2023  Visit # / Visits authorized: 11 / 20   FOTO: 4/3     Precautions: Standard      Time In: 1305  Time Out: 1345  Total Appointment Time (timed & untimed codes): 40 minutes     PTA Visit #: 0     FOTO first follow up: 61  FOTO second follow up: 59  FOTO discharge 76/100 = 24% limitation       SUBJECTIVE     Pt reports: she is about to join the gym. Her goal is to loose some weight. She feels the back when she is doing heavy lifting. She was sitting on a blanket the other day and her  pulled the blanket underneath her and she felt like this tweaked her back.     She notices the hip when she is standing a lot and doing increased activity.     The worst the pain has been over the past few days about 5/10 for hip. Current pain is 0/10 for back and hips.    Reports she wasn't really having back pain until she started therapy. With prolong standing she reports she starts to feel herself arch her low back. Pain in back with prolong standing and lifting and prolong sitting.     She was compliant with home exercise program.  Response to previous treatment: positive   Functional change: none reported     Pain: 0/10  Location: bilateral hips      OBJECTIVE     Objective Measures updated at progress report unless specified.     Stair navigation:   B hip ROM WNL in all direction   MMT:    Flexion: 4+/5   Extension: 4+/5     Some  low back pain no improvement with stability added    ABD: 4/5 slight pain bilaterally, L > R   Add: 5/5   ER: 4+/5   IR: 4/5   Supine repeated flexion : increased low back pain   Prone extensions: minimal change , slightly worse    Stair navigation: reciprocal no pain without HR    Treatment     Bhavna received the treatments listed below:      therapeutic exercises to develop strength, endurance, posture, and core stabilization for 40 minutes including:    Testing above   Review of prescribed HEP:   Planks 4 x 20 sec on toes , cues for glut activation   Fire hydrants 10x each LE  Bird/dog 2 x 5 reps each side   Prone press ups 2 x 20, slight increase in low back pain   Standing lateral step down 15x each LE     Patient Education and Home Exercises     Home Exercises Provided and Patient Education Provided     Education provided:   -  perform extension based movements following flexed posture and lifting  - if she finds herself arching her back in standing / increased lordosis, cue herself to find flexion / engage her core   - perform stability exercises as home for back pain   - continue with isotonic hip strengthening, revert back to isometrics if pain increases     Written Home Exercises Provided: Patient instructed to cont prior HEP. Exercises were reviewed and Bhavna was able to demonstrate them prior to the end of the session.  Bhavna demonstrated good  understanding of the education provided. See EMR under Patient Instructions for exercises provided during therapy sessions    ASSESSMENT   See discharge note.       PLAN     See discharge note.     Carla Valentin, PT

## 2023-05-23 ENCOUNTER — OFFICE VISIT (OUTPATIENT)
Dept: FAMILY MEDICINE | Facility: CLINIC | Age: 38
End: 2023-05-23
Payer: MEDICAID

## 2023-05-23 VITALS
SYSTOLIC BLOOD PRESSURE: 120 MMHG | HEIGHT: 65 IN | OXYGEN SATURATION: 98 % | WEIGHT: 187 LBS | HEART RATE: 87 BPM | DIASTOLIC BLOOD PRESSURE: 80 MMHG | BODY MASS INDEX: 31.16 KG/M2 | TEMPERATURE: 98 F

## 2023-05-23 DIAGNOSIS — N91.2 AMENORRHEA: Primary | ICD-10-CM

## 2023-05-23 DIAGNOSIS — E66.09 CLASS 1 OBESITY DUE TO EXCESS CALORIES WITH SERIOUS COMORBIDITY AND BODY MASS INDEX (BMI) OF 31.0 TO 31.9 IN ADULT: ICD-10-CM

## 2023-05-23 DIAGNOSIS — Z34.90 PREGNANCY, UNSPECIFIED GESTATIONAL AGE: ICD-10-CM

## 2023-05-23 DIAGNOSIS — J45.20 MILD INTERMITTENT REACTIVE AIRWAY DISEASE WITHOUT COMPLICATION: ICD-10-CM

## 2023-05-23 LAB
B-HCG UR QL: POSITIVE
CTP QC/QA: YES

## 2023-05-23 PROCEDURE — 99213 PR OFFICE/OUTPT VISIT, EST, LEVL III, 20-29 MIN: ICD-10-PCS | Mod: S$PBB,,, | Performed by: NURSE PRACTITIONER

## 2023-05-23 PROCEDURE — 3079F DIAST BP 80-89 MM HG: CPT | Mod: CPTII,,, | Performed by: NURSE PRACTITIONER

## 2023-05-23 PROCEDURE — 3074F PR MOST RECENT SYSTOLIC BLOOD PRESSURE < 130 MM HG: ICD-10-PCS | Mod: CPTII,,, | Performed by: NURSE PRACTITIONER

## 2023-05-23 PROCEDURE — 1160F RVW MEDS BY RX/DR IN RCRD: CPT | Mod: CPTII,,, | Performed by: NURSE PRACTITIONER

## 2023-05-23 PROCEDURE — 99214 OFFICE O/P EST MOD 30 MIN: CPT | Performed by: NURSE PRACTITIONER

## 2023-05-23 PROCEDURE — 3079F PR MOST RECENT DIASTOLIC BLOOD PRESSURE 80-89 MM HG: ICD-10-PCS | Mod: CPTII,,, | Performed by: NURSE PRACTITIONER

## 2023-05-23 PROCEDURE — 3074F SYST BP LT 130 MM HG: CPT | Mod: CPTII,,, | Performed by: NURSE PRACTITIONER

## 2023-05-23 PROCEDURE — 3008F PR BODY MASS INDEX (BMI) DOCUMENTED: ICD-10-PCS | Mod: CPTII,,, | Performed by: NURSE PRACTITIONER

## 2023-05-23 PROCEDURE — 1159F PR MEDICATION LIST DOCUMENTED IN MEDICAL RECORD: ICD-10-PCS | Mod: CPTII,,, | Performed by: NURSE PRACTITIONER

## 2023-05-23 PROCEDURE — 81025 URINE PREGNANCY TEST: CPT | Mod: PBBFAC | Performed by: NURSE PRACTITIONER

## 2023-05-23 PROCEDURE — 3008F BODY MASS INDEX DOCD: CPT | Mod: CPTII,,, | Performed by: NURSE PRACTITIONER

## 2023-05-23 PROCEDURE — 1160F PR REVIEW ALL MEDS BY PRESCRIBER/CLIN PHARMACIST DOCUMENTED: ICD-10-PCS | Mod: CPTII,,, | Performed by: NURSE PRACTITIONER

## 2023-05-23 PROCEDURE — 1159F MED LIST DOCD IN RCRD: CPT | Mod: CPTII,,, | Performed by: NURSE PRACTITIONER

## 2023-05-23 PROCEDURE — 99213 OFFICE O/P EST LOW 20 MIN: CPT | Mod: S$PBB,,, | Performed by: NURSE PRACTITIONER

## 2023-05-23 NOTE — PROGRESS NOTES
Subjective:       Patient ID: Bhavna Heart is a 38 y.o. female.    Chief Complaint: Routine Prenatal Visit    Patient presents today with concerns of missed menstrual cycle and positive pregnancy test at home. The pregnancy is unintentional. She has has 3 pregnancies and 3 children. This is her 4th pregnancy. She has had 3 vaginal deliveries. Her last menstrual cycle was aproximately 4/13/23. She is not sure if this was the start of end of the menstrual cycle.  Patient is not taking a prenatal vitamin and will be encouraged to start this.  She is asthmatic and taking her advair daily. Albuterol she is using for a rescue inhaler.  Patient is obese with a BMI of 31.60    Review of Systems   Constitutional:  Negative for activity change, appetite change and fever.   HENT:  Negative for congestion, ear discharge, ear pain, sore throat, trouble swallowing and voice change.    Eyes:  Negative for photophobia, pain, discharge and visual disturbance.   Respiratory:  Negative for cough, chest tightness and shortness of breath.    Cardiovascular:  Negative for chest pain and palpitations.   Gastrointestinal:  Negative for abdominal pain, nausea and vomiting.   Endocrine: Negative for cold intolerance and heat intolerance.   Genitourinary:  Positive for menstrual problem. Negative for difficulty urinating and dysuria.        Positive pregnancy test   Musculoskeletal:  Negative for arthralgias and gait problem.   Skin:  Negative for rash.   Allergic/Immunologic: Negative for immunocompromised state.   Neurological:  Negative for speech difficulty and headaches.   Psychiatric/Behavioral:  Negative for confusion, self-injury and suicidal ideas.    History reviewed. No pertinent past medical history. History reviewed. No pertinent surgical history.    Family History   Problem Relation Age of Onset    No Known Problems Mother     Asthma Father     Asthma Maternal Aunt     Cancer Paternal Uncle     Diabetes Maternal  "Grandmother     Thyroid disease Maternal Grandmother     Cancer Maternal Grandfather     Kidney disease Maternal Grandfather     Heart disease Paternal Grandfather     Stroke Paternal Grandfather        Social History     Socioeconomic History    Marital status:    Tobacco Use    Smoking status: Former     Packs/day: 0.50     Types: Cigarettes     Quit date: 2022     Years since quittin.3    Smokeless tobacco: Never   Substance and Sexual Activity    Alcohol use: No    Drug use: No    Sexual activity: Yes     Partners: Male       Current Outpatient Medications   Medication Sig Dispense Refill    albuterol (PROVENTIL) 2.5 mg /3 mL (0.083 %) nebulizer solution Take 3 mLs (2.5 mg total) by nebulization every 6 (six) hours as needed for Wheezing. Rescue 1 each 2    azelastine (ASTELIN) 137 mcg (0.1 %) nasal spray SPRAY 1 SPRAY BY NASAL ROUTE 2 TIMES DAILY. 30 mL 5    fluticasone propionate (FLONASE) 50 mcg/actuation nasal spray 1 spray (50 mcg total) by Each Nostril route 2 (two) times daily. 16 mL 2    fluticasone-salmeterol diskus inhaler 250-50 mcg Inhale 1 puff into the lungs 2 (two) times daily. Controller 60 each 5     No current facility-administered medications for this visit.       Review of patient's allergies indicates:   Allergen Reactions    Doxycycline Nausea Only     Objective:      Blood pressure 120/80, pulse 87, temperature 98 °F (36.7 °C), height 5' 4.5" (1.638 m), weight 84.8 kg (187 lb), last menstrual period 2023, SpO2 98 %, unknown if currently breastfeeding. Body mass index is 31.6 kg/m².   Physical Exam  Vitals and nursing note reviewed.   Constitutional:       General: She is not in acute distress.     Appearance: Normal appearance. She is well-developed. She is obese. She is not ill-appearing.   HENT:      Head: Normocephalic and atraumatic.      Right Ear: External ear normal.      Left Ear: External ear normal.      Nose: Nose normal.      Mouth/Throat:      Mouth: " Mucous membranes are moist.   Eyes:      General: Lids are normal. Lids are everted, no foreign bodies appreciated.      Conjunctiva/sclera: Conjunctivae normal.      Pupils: Pupils are equal, round, and reactive to light.      Right eye: Pupil is round and reactive.      Left eye: Pupil is round and reactive.   Neck:      Trachea: Trachea normal.   Cardiovascular:      Rate and Rhythm: Normal rate and regular rhythm.      Pulses: Normal pulses.      Heart sounds: Normal heart sounds, S1 normal and S2 normal.   Pulmonary:      Effort: Pulmonary effort is normal. No respiratory distress.      Breath sounds: Normal breath sounds.   Abdominal:      General: Abdomen is flat. Bowel sounds are normal. There is no distension.      Palpations: Abdomen is soft. Abdomen is not rigid.      Tenderness: There is no abdominal tenderness. There is no guarding.   Musculoskeletal:         General: Normal range of motion.      Cervical back: Normal range of motion and neck supple. No muscular tenderness.   Lymphadenopathy:      Cervical: No cervical adenopathy.   Skin:     General: Skin is warm and dry.      Capillary Refill: Capillary refill takes less than 2 seconds.   Neurological:      General: No focal deficit present.      Mental Status: She is alert and oriented to person, place, and time.   Psychiatric:         Mood and Affect: Mood normal.         Behavior: Behavior normal. Behavior is cooperative.         Thought Content: Thought content normal.         Judgment: Judgment normal.           Assessment:       1. Amenorrhea    2. Pregnancy, unspecified gestational age    3. Mild intermittent reactive airway disease without complication    4. Class 1 obesity due to excess calories with serious comorbidity and body mass index (BMI) of 31.0 to 31.9 in adult        Plan:       Bhavna was seen today for routine prenatal visit.    Diagnoses and all orders for this visit:    Amenorrhea  -     POCT urine pregnancy  (positive)    Pregnancy, unspecified gestational age  -     Ambulatory referral/consult to Obstetrics / Gynecology; Future    Mild intermittent reactive airway disease without complication  Stable    Class 1 obesity due to excess calories with serious comorbidity and body mass index (BMI) of 31.0 to 31.9 in adult  The patient is asked to make an attempt to improve diet and exercise patterns to aid in medical management of this problem.         Follow up with OB. Start prenatal vitamin today.

## 2023-06-02 ENCOUNTER — HOSPITAL ENCOUNTER (EMERGENCY)
Facility: HOSPITAL | Age: 38
Discharge: HOME | End: 2023-06-02
Attending: EMERGENCY MEDICINE
Payer: MEDICAID

## 2023-06-02 VITALS
RESPIRATION RATE: 18 BRPM | TEMPERATURE: 98 F | WEIGHT: 188 LBS | SYSTOLIC BLOOD PRESSURE: 132 MMHG | HEART RATE: 77 BPM | HEIGHT: 64 IN | DIASTOLIC BLOOD PRESSURE: 87 MMHG | BODY MASS INDEX: 32.1 KG/M2 | OXYGEN SATURATION: 96 %

## 2023-06-02 DIAGNOSIS — O20.9 VAGINAL BLEEDING IN PREGNANCY, FIRST TRIMESTER: Primary | ICD-10-CM

## 2023-06-02 DIAGNOSIS — N93.9 VAGINAL BLEEDING: ICD-10-CM

## 2023-06-02 LAB
ABO + RH BLD: NORMAL
ALBUMIN SERPL BCP-MCNC: 4.5 G/DL (ref 3.5–5.2)
ALP SERPL-CCNC: 49 U/L (ref 55–135)
ALT SERPL W/O P-5'-P-CCNC: 45 U/L (ref 10–44)
ANION GAP SERPL CALC-SCNC: 9 MMOL/L (ref 8–16)
AST SERPL-CCNC: 26 U/L (ref 10–40)
B-HCG UR QL: POSITIVE
B-HCG UR QL: POSITIVE
BACTERIA #/AREA URNS HPF: ABNORMAL /HPF
BASOPHILS # BLD AUTO: 0.06 K/UL (ref 0–0.2)
BASOPHILS NFR BLD: 0.6 % (ref 0–1.9)
BILIRUB SERPL-MCNC: 0.5 MG/DL (ref 0.1–1)
BILIRUB UR QL STRIP: NEGATIVE
BUN SERPL-MCNC: 13 MG/DL (ref 6–20)
CALCIUM SERPL-MCNC: 9.2 MG/DL (ref 8.7–10.5)
CHLORIDE SERPL-SCNC: 105 MMOL/L (ref 95–110)
CLARITY UR: CLEAR
CO2 SERPL-SCNC: 23 MMOL/L (ref 23–29)
COLOR UR: YELLOW
CREAT SERPL-MCNC: 0.6 MG/DL (ref 0.5–1.4)
CTP QC/QA: YES
CTP QC/QA: YES
DIFFERENTIAL METHOD: ABNORMAL
EOSINOPHIL # BLD AUTO: 0.6 K/UL (ref 0–0.5)
EOSINOPHIL NFR BLD: 6.6 % (ref 0–8)
ERYTHROCYTE [DISTWIDTH] IN BLOOD BY AUTOMATED COUNT: 12.1 % (ref 11.5–14.5)
EST. GFR  (NO RACE VARIABLE): >60 ML/MIN/1.73 M^2
GLUCOSE SERPL-MCNC: 94 MG/DL (ref 70–110)
GLUCOSE UR QL STRIP: NEGATIVE
HCG INTACT+B SERPL-ACNC: 585 MIU/ML
HCT VFR BLD AUTO: 45.4 % (ref 37–48.5)
HGB BLD-MCNC: 15.2 G/DL (ref 12–16)
HGB UR QL STRIP: ABNORMAL
HYALINE CASTS #/AREA URNS LPF: 4 /LPF
IMM GRANULOCYTES # BLD AUTO: 0.02 K/UL (ref 0–0.04)
IMM GRANULOCYTES NFR BLD AUTO: 0.2 % (ref 0–0.5)
KETONES UR QL STRIP: NEGATIVE
LEUKOCYTE ESTERASE UR QL STRIP: NEGATIVE
LYMPHOCYTES # BLD AUTO: 2.6 K/UL (ref 1–4.8)
LYMPHOCYTES NFR BLD: 26.5 % (ref 18–48)
MCH RBC QN AUTO: 29.6 PG (ref 27–31)
MCHC RBC AUTO-ENTMCNC: 33.5 G/DL (ref 32–36)
MCV RBC AUTO: 88 FL (ref 82–98)
MICROSCOPIC COMMENT: ABNORMAL
MONOCYTES # BLD AUTO: 0.6 K/UL (ref 0.3–1)
MONOCYTES NFR BLD: 6 % (ref 4–15)
NEUTROPHILS # BLD AUTO: 5.9 K/UL (ref 1.8–7.7)
NEUTROPHILS NFR BLD: 60.1 % (ref 38–73)
NITRITE UR QL STRIP: NEGATIVE
NRBC BLD-RTO: 0 /100 WBC
PH UR STRIP: 6 [PH] (ref 5–8)
PLATELET # BLD AUTO: 363 K/UL (ref 150–450)
PMV BLD AUTO: 9.9 FL (ref 9.2–12.9)
POTASSIUM SERPL-SCNC: 3.9 MMOL/L (ref 3.5–5.1)
PROT SERPL-MCNC: 7.7 G/DL (ref 6–8.4)
PROT UR QL STRIP: NEGATIVE
RBC # BLD AUTO: 5.14 M/UL (ref 4–5.4)
RBC #/AREA URNS HPF: 7 /HPF (ref 0–4)
SODIUM SERPL-SCNC: 137 MMOL/L (ref 136–145)
SP GR UR STRIP: 1.01 (ref 1–1.03)
SQUAMOUS #/AREA URNS HPF: 5 /HPF
URN SPEC COLLECT METH UR: ABNORMAL
UROBILINOGEN UR STRIP-ACNC: NEGATIVE EU/DL
WBC # BLD AUTO: 9.76 K/UL (ref 3.9–12.7)
WBC #/AREA URNS HPF: 4 /HPF (ref 0–5)

## 2023-06-02 PROCEDURE — 81025 URINE PREGNANCY TEST: CPT

## 2023-06-02 PROCEDURE — 80053 COMPREHEN METABOLIC PANEL: CPT

## 2023-06-02 PROCEDURE — 85025 COMPLETE CBC W/AUTO DIFF WBC: CPT

## 2023-06-02 PROCEDURE — 84702 CHORIONIC GONADOTROPIN TEST: CPT

## 2023-06-02 PROCEDURE — 25000003 PHARM REV CODE 250

## 2023-06-02 PROCEDURE — 99284 EMERGENCY DEPT VISIT MOD MDM: CPT | Mod: 25

## 2023-06-02 PROCEDURE — 86900 BLOOD TYPING SEROLOGIC ABO: CPT

## 2023-06-02 PROCEDURE — 96360 HYDRATION IV INFUSION INIT: CPT

## 2023-06-02 PROCEDURE — 81001 URINALYSIS AUTO W/SCOPE: CPT

## 2023-06-02 RX ADMIN — SODIUM CHLORIDE 1000 ML: 0.9 INJECTION, SOLUTION INTRAVENOUS at 09:06

## 2023-06-02 NOTE — FIRST PROVIDER EVALUATION
"Medical screening examination initiated.  I have conducted a focused provider triage encounter, findings are as follows:    Brief history of present illness:  Vaginal bleeding 7 weeks pregnant.  Denies cramping.  Denies clots.  States the blood is bright red.  Symptoms started today.  He has her 1st appointment with her OB next    Vitals:    06/02/23 1746   BP: (!) 170/111   Pulse: 100   Resp: 18   Temp: 98.1 °F (36.7 °C)   TempSrc: Oral   SpO2: 99%   Weight: 85.3 kg (188 lb)   Height: 5' 4" (1.626 m)       Pertinent physical exam:  Unable to fully assess she is in the waiting room    Brief workup plan:  Labs OB ultrasound    Preliminary workup initiated; this workup will be continued and followed by the physician or advanced practice provider that is assigned to the patient when roomed.  "

## 2023-06-03 NOTE — ED PROVIDER NOTES
Encounter Date: 2023       History     Chief Complaint   Patient presents with    Vaginal Bleeding     Vaginal bleeding starting today, pt is 7 weeks pregnant     38-year-old G4 P 3 at approximately 7 weeks gestation presents secondary to vaginal bleeding.  Patient states she has felt mild cramping on both sides in her pelvis and reports mild vaginal bleeding as well.  She states he has had pregnancies in the past but no complications.  She states she is not seen her OBGYN yet and she is not 100% sure of how far along she is.  She denies any nausea, vomiting, fever, chills or sweats associated.  Patient is otherwise stable and has no other complaints.    Review of patient's allergies indicates:   Allergen Reactions    Doxycycline Nausea Only     No past medical history on file.  No past surgical history on file.  Family History   Problem Relation Age of Onset    No Known Problems Mother     Asthma Father     Asthma Maternal Aunt     Cancer Paternal Uncle     Diabetes Maternal Grandmother     Thyroid disease Maternal Grandmother     Cancer Maternal Grandfather     Kidney disease Maternal Grandfather     Heart disease Paternal Grandfather     Stroke Paternal Grandfather      Social History     Tobacco Use    Smoking status: Former     Packs/day: 0.50     Types: Cigarettes     Quit date: 2022     Years since quittin.3    Smokeless tobacco: Never   Substance Use Topics    Alcohol use: No    Drug use: No     Review of Systems   Genitourinary:  Positive for vaginal bleeding.   All other systems reviewed and are negative.    Physical Exam     Initial Vitals [23 1746]   BP Pulse Resp Temp SpO2   (!) 170/111 100 18 98.1 °F (36.7 °C) 99 %      MAP       --         Physical Exam    Nursing note and vitals reviewed.  Constitutional: She appears well-developed and well-nourished. No distress.   HENT:   Head: Normocephalic and atraumatic.   Mouth/Throat: Oropharynx is clear and moist.   Eyes: Conjunctivae and  EOM are normal. Pupils are equal, round, and reactive to light.   Neck: No tracheal deviation present. No JVD present.   Normal range of motion.  Cardiovascular:  Normal rate, regular rhythm, normal heart sounds and intact distal pulses.     Exam reveals no gallop and no friction rub.       No murmur heard.  Pulmonary/Chest: Breath sounds normal. No respiratory distress. She has no wheezes. She exhibits no tenderness.   Abdominal: Abdomen is soft. Bowel sounds are normal. She exhibits no distension. There is no abdominal tenderness. There is no rebound and no guarding.   Musculoskeletal:         General: No tenderness or edema. Normal range of motion.      Cervical back: Normal range of motion.     Neurological: She is alert and oriented to person, place, and time. She has normal strength. No cranial nerve deficit or sensory deficit.   Skin: Skin is warm and dry. Capillary refill takes less than 2 seconds. No erythema.   Psychiatric: She has a normal mood and affect.       ED Course   Procedures  Labs Reviewed   URINALYSIS, REFLEX TO URINE CULTURE - Abnormal; Notable for the following components:       Result Value    Occult Blood UA 3+ (*)     All other components within normal limits    Narrative:     Specimen Source->Urine   CBC W/ AUTO DIFFERENTIAL - Abnormal; Notable for the following components:    Eos # 0.6 (*)     All other components within normal limits    Narrative:     Release to patient->Immediate   COMPREHENSIVE METABOLIC PANEL - Abnormal; Notable for the following components:    Alkaline Phosphatase 49 (*)     ALT 45 (*)     All other components within normal limits    Narrative:     Release to patient->Immediate   URINALYSIS MICROSCOPIC - Abnormal; Notable for the following components:    RBC, UA 7 (*)     Hyaline Casts, UA 4 (*)     All other components within normal limits    Narrative:     Specimen Source->Urine   POCT URINE PREGNANCY - Abnormal; Notable for the following components:    POC Preg  "Test, Ur Positive (*)     All other components within normal limits   POCT URINE PREGNANCY - Abnormal; Notable for the following components:    POC Preg Test, Ur Positive (*)     All other components within normal limits   HCG, QUANTITATIVE    Narrative:     Release to patient->Immediate   GROUP & RH          Imaging Results              US OB Transvaginal (Final result)  Result time 06/02/23 21:04:55   Procedure changed from US OB <14 Wks, TransAbd, Single Gestation     Final result by Paolo Sandhu MD (06/02/23 21:04:55)                   Narrative:    EXAM DESCRIPTION:  US OB TRANSVAGINAL  RadLex: US PREGNANCY TRANSVAGINAL    CLINICAL HISTORY:  38 years  Female;  vag bleeding    TECHNOLOGIST NOTES:    TECHNIQUE: Real time multiplanar ultrasonographic gray scale imaging was obtained of the pelvis.  Multiple images obtained.    FINDINGS:    Uterus is 8.1 x 5 x 6.2 cm. The endometrium is 2 cm thick. There is no evidence of an intrauterine gestational sac. The right ovary is 3.6 x 2.4 x 2.7 cm with a volume of 12.1 cm. There is a complex lesion in the right ovary with a possible "ring of fire". The left ovary is 2.9 x 1.1 x 1.1 cm with a volume of 1.9 cm.    No significant free fluid in the pelvis. Vascular flow demonstrated to both ovaries.    IMPRESSION:  1.  Endometrium is thickened but there is no evidence of an intrauterine gestational sac.  2.  There is an indeterminate mass in the right ovary.  Ectopic pregnancy is possible. Clinical correlation and follow-up evaluation will be needed..    Addendum:  These findings were discussed personally by phone with, and an understanding was acknowledged by, Dr Philippe on 6/2/2023 9:04 PM CDT    Electronically signed by:  Paolo Sandhu MD  6/2/2023 9:04 PM CDT Workstation: [id number]                                     Medications   sodium chloride 0.9% bolus 1,000 mL 1,000 mL (1,000 mLs Intravenous New Bag 6/2/23 2109)     Medical Decision Making:   Initial Assessment: "   [age] year female initial assessment in no acute distress.  Patient is alert oriented x3, neurologically and neurovascular intact no focal deficits.  She is nontoxic-appearing and vitals stable at this time.  Differential Diagnosis:   Ectopic pregnancy, IUP, threatened , UTI  Clinical Tests:   Lab Tests: Ordered and Reviewed  The following lab test(s) were unremarkable: CBC, CMP, Urinalysis, UPT and B-HCG  Radiological Study: Ordered and Reviewed  ED Management:  Patient has been reassessed noted to have no acute changes in her condition.  I did consult OBGYN on-call Dr. Lawrence who states that patient is bar early in her pregnancy and should follow-up in 2 days for repeat beta hCG and possible ultrasound with OBGYN.  She states he is not concern for ectopic at this time and that the ovarian mass could be a cyst and unrelated to her pregnancy.  Patient has remained stable while in the ED and discharged home stable condition with OBGYN follow-up as discussed.  Ms. Heart and  are aware of the plan and in agreement with discharge.    Pt's plan and diagnosis was discussed. All questions were answered and patient was comfortable with the plan. This patient was personally seen and personally examined by me and I personally performed the services described in this documentation.   Complexity of the visit is established by the note or I have spent at least the amount of time discussing findings, exam and/or radiographs or imaging studies.     MD uses EPIC and voice recognition software prone to occasional and minor errors that may persist in the medical record.                          Clinical Impression:   Final diagnoses:  [N93.9] Vaginal bleeding  [O20.9] Vaginal bleeding in pregnancy, first trimester (Primary)        ED Disposition Condition    Discharge Stable          ED Prescriptions    None       Follow-up Information       Follow up With Specialties Details Why Contact Info Additional  Information    formerly [facility] - Emergency Dept Emergency Medicine  As needed, If symptoms worsen [address] [facility] [id number] [phone] 1st floor    [facility] Schedule an appointment as soon as possible for a visit  As needed, If symptoms worsen [address] [facility] [address]  [phone]       Lakesha Layton MD Obstetrics, Obstetrics and Gynecology Schedule an appointment as soon as possible for a visit in 2 days repeat beta hcg and US [address] [facility]  [address]  [facility]ST[facility]RIC [facility]  [facility] [address]  [phone]                Deonte Kaur MD  06/02/23 2126

## 2023-06-12 ENCOUNTER — HOSPITAL ENCOUNTER (OUTPATIENT)
Dept: RADIOLOGY | Facility: HOSPITAL | Age: 38
Discharge: HOME | End: 2023-06-12
Attending: STUDENT IN AN ORGANIZED HEALTH CARE EDUCATION/TRAINING PROGRAM
Payer: MEDICAID

## 2023-06-12 DIAGNOSIS — O02.1 MISSED ABORTION: ICD-10-CM

## 2023-06-12 DIAGNOSIS — O02.1 MISSED ABORTION: Primary | ICD-10-CM

## 2023-06-12 PROCEDURE — 76856 US EXAM PELVIC COMPLETE: CPT | Mod: TC

## 2023-06-14 ENCOUNTER — OFFICE VISIT (OUTPATIENT)
Dept: FAMILY MEDICINE | Facility: CLINIC | Age: 38
End: 2023-06-14
Payer: MEDICAID

## 2023-06-14 VITALS
HEART RATE: 101 BPM | TEMPERATURE: 98 F | SYSTOLIC BLOOD PRESSURE: 110 MMHG | DIASTOLIC BLOOD PRESSURE: 72 MMHG | BODY MASS INDEX: 32.44 KG/M2 | HEIGHT: 64 IN | WEIGHT: 190 LBS | OXYGEN SATURATION: 97 %

## 2023-06-14 DIAGNOSIS — J45.20 MILD INTERMITTENT REACTIVE AIRWAY DISEASE WITHOUT COMPLICATION: ICD-10-CM

## 2023-06-14 DIAGNOSIS — E78.5 MILD HYPERLIPIDEMIA: Primary | ICD-10-CM

## 2023-06-14 PROCEDURE — 3078F DIAST BP <80 MM HG: CPT | Mod: CPTII,,, | Performed by: NURSE PRACTITIONER

## 2023-06-14 PROCEDURE — 3008F BODY MASS INDEX DOCD: CPT | Mod: CPTII,,, | Performed by: NURSE PRACTITIONER

## 2023-06-14 PROCEDURE — 3008F PR BODY MASS INDEX (BMI) DOCUMENTED: ICD-10-PCS | Mod: CPTII,,, | Performed by: NURSE PRACTITIONER

## 2023-06-14 PROCEDURE — 3074F SYST BP LT 130 MM HG: CPT | Mod: CPTII,,, | Performed by: NURSE PRACTITIONER

## 2023-06-14 PROCEDURE — 99214 OFFICE O/P EST MOD 30 MIN: CPT | Performed by: NURSE PRACTITIONER

## 2023-06-14 PROCEDURE — 1159F PR MEDICATION LIST DOCUMENTED IN MEDICAL RECORD: ICD-10-PCS | Mod: CPTII,,, | Performed by: NURSE PRACTITIONER

## 2023-06-14 PROCEDURE — 1159F MED LIST DOCD IN RCRD: CPT | Mod: CPTII,,, | Performed by: NURSE PRACTITIONER

## 2023-06-14 PROCEDURE — 1160F PR REVIEW ALL MEDS BY PRESCRIBER/CLIN PHARMACIST DOCUMENTED: ICD-10-PCS | Mod: CPTII,,, | Performed by: NURSE PRACTITIONER

## 2023-06-14 PROCEDURE — 99213 OFFICE O/P EST LOW 20 MIN: CPT | Mod: S$PBB,,, | Performed by: NURSE PRACTITIONER

## 2023-06-14 PROCEDURE — 99213 PR OFFICE/OUTPT VISIT, EST, LEVL III, 20-29 MIN: ICD-10-PCS | Mod: S$PBB,,, | Performed by: NURSE PRACTITIONER

## 2023-06-14 PROCEDURE — 1160F RVW MEDS BY RX/DR IN RCRD: CPT | Mod: CPTII,,, | Performed by: NURSE PRACTITIONER

## 2023-06-14 PROCEDURE — 3074F PR MOST RECENT SYSTOLIC BLOOD PRESSURE < 130 MM HG: ICD-10-PCS | Mod: CPTII,,, | Performed by: NURSE PRACTITIONER

## 2023-06-14 PROCEDURE — 3078F PR MOST RECENT DIASTOLIC BLOOD PRESSURE < 80 MM HG: ICD-10-PCS | Mod: CPTII,,, | Performed by: NURSE PRACTITIONER

## 2023-06-14 RX ORDER — ALBUTEROL SULFATE 90 UG/1
2 AEROSOL, METERED RESPIRATORY (INHALATION) EVERY 6 HOURS PRN
COMMUNITY
End: 2023-06-14 | Stop reason: SDUPTHER

## 2023-06-14 RX ORDER — FLUTICASONE PROPIONATE AND SALMETEROL 250; 50 UG/1; UG/1
1 POWDER RESPIRATORY (INHALATION) 2 TIMES DAILY
Qty: 60 EACH | Refills: 5
Start: 2023-06-14 | End: 2024-02-26 | Stop reason: SDUPTHER

## 2023-06-14 RX ORDER — ALBUTEROL SULFATE 90 UG/1
2 AEROSOL, METERED RESPIRATORY (INHALATION) EVERY 6 HOURS PRN
Qty: 18 G | Refills: 5 | Status: SHIPPED | OUTPATIENT
Start: 2023-06-14 | End: 2023-12-30 | Stop reason: SDUPTHER

## 2023-06-14 NOTE — PROGRESS NOTES
Subjective:       Patient ID: Bhavna Heart is a 38 y.o. female.    Chief Complaint: Asthma and Hyperlipidemia    Patient presents today originally scheduled for asthma and hyperlipidemia follow-up.  She was seen a few weeks ago and was diagnosed with pregnancy.  Patient did not intentionally conceive.  She started with some vaginal bleeding about 2 weeks ago and went to the emergency room.  An ultrasound was completed but neither further evaluation.  Patient then followed up with OB on an outpatient basis and confirmed miscarriage.  Patient has been taking her albuterol p.r.n..  She states that she only takes the Advair when she is having more difficulty with breathing.  She had mild hyperlipidemia but states that her diet has been liberal since the diagnosis of pregnancy.  Patient will obtain labs to recheck her cholesterol but will obtain the blood work in about a month after she states that she will improve her diet.  Patient is obese with a BMI 32.61    Asthma  She complains of wheezing. There is no chest tightness, cough, hemoptysis, hoarse voice or shortness of breath. The current episode started more than 1 month ago. Asthma causes daytime symptoms weekly. Asthma causes nighttime symptoms weekly. The problem has been waxing and waning. Associated symptoms include nasal congestion. Pertinent negatives include no appetite change, chest pain, ear pain, fever, headaches, sore throat or trouble swallowing. Her symptoms are aggravated by pollen, strenuous activity and exposure to smoke. Her symptoms are alleviated by rest and beta-agonist. She reports moderate improvement on treatment. Her symptoms are not alleviated by rest and beta-agonist. Her past medical history is significant for asthma.   Hyperlipidemia  This is a chronic problem. The current episode started more than 1 month ago. The problem is uncontrolled. Recent lipid tests were reviewed and are variable. Exacerbating diseases include  obesity. She has no history of hypothyroidism. Factors aggravating her hyperlipidemia include fatty foods. Pertinent negatives include no chest pain, focal weakness or shortness of breath. She is currently on no antihyperlipidemic treatment. The current treatment provides no improvement of lipids. Compliance problems include adherence to diet.  Risk factors for coronary artery disease include dyslipidemia and stress.   Review of Systems   Constitutional:  Negative for activity change, appetite change and fever.        Obesity   HENT:  Negative for congestion, ear discharge, ear pain, hoarse voice, sore throat, trouble swallowing and voice change.    Eyes:  Negative for photophobia, pain, discharge and visual disturbance.   Respiratory:  Positive for wheezing. Negative for cough, hemoptysis, chest tightness and shortness of breath.    Cardiovascular:  Negative for chest pain and palpitations.        Mild hyperlipidemia   Gastrointestinal:  Negative for abdominal pain, nausea and vomiting.   Endocrine: Negative for cold intolerance and heat intolerance.   Genitourinary:  Negative for difficulty urinating and dysuria.        Recent miscarriage   Musculoskeletal:  Negative for arthralgias and gait problem.   Skin:  Negative for rash.   Allergic/Immunologic: Negative for immunocompromised state.   Neurological:  Negative for focal weakness, speech difficulty and headaches.   Psychiatric/Behavioral:  Negative for confusion, self-injury and suicidal ideas.    History reviewed. No pertinent past medical history. History reviewed. No pertinent surgical history.    Family History   Problem Relation Age of Onset    No Known Problems Mother     Asthma Father     Asthma Maternal Aunt     Cancer Paternal Uncle     Diabetes Maternal Grandmother     Thyroid disease Maternal Grandmother     Cancer Maternal Grandfather     Kidney disease Maternal Grandfather     Heart disease Paternal Grandfather     Stroke Paternal Grandfather   "      Social History     Socioeconomic History    Marital status:    Tobacco Use    Smoking status: Former     Packs/day: 0.50     Types: Cigarettes     Quit date: 2022     Years since quittin.3    Smokeless tobacco: Never   Substance and Sexual Activity    Alcohol use: No    Drug use: No    Sexual activity: Yes     Partners: Male       Current Outpatient Medications   Medication Sig Dispense Refill    albuterol (PROVENTIL/VENTOLIN HFA) 90 mcg/actuation inhaler Inhale 2 puffs into the lungs every 6 (six) hours as needed for Wheezing. Rescue 18 g 5    azelastine (ASTELIN) 137 mcg (0.1 %) nasal spray SPRAY 1 SPRAY BY NASAL ROUTE 2 TIMES DAILY. (Patient not taking: Reported on 2023) 30 mL 5    fluticasone propionate (FLONASE) 50 mcg/actuation nasal spray 1 spray (50 mcg total) by Each Nostril route 2 (two) times daily. (Patient not taking: Reported on 2023) 16 mL 2    fluticasone-salmeterol diskus inhaler 250-50 mcg Inhale 1 puff into the lungs 2 (two) times daily. Controller 60 each 5     No current facility-administered medications for this visit.       Review of patient's allergies indicates:   Allergen Reactions    Doxycycline Nausea Only     Objective:      Blood pressure 110/72, pulse 101, temperature 98.4 °F (36.9 °C), height 5' 4" (1.626 m), weight 86.2 kg (190 lb), last menstrual period 2023, SpO2 97 %, unknown if currently breastfeeding. Body mass index is 32.61 kg/m².   Physical Exam  Vitals and nursing note reviewed.   Constitutional:       General: She is not in acute distress.     Appearance: Normal appearance. She is well-developed. She is obese.   HENT:      Head: Normocephalic and atraumatic.      Right Ear: Tympanic membrane, ear canal and external ear normal.      Left Ear: Tympanic membrane, ear canal and external ear normal.      Nose: Nose normal.      Mouth/Throat:      Mouth: Mucous membranes are moist.   Eyes:      General: Lids are normal. Lids are everted, no " foreign bodies appreciated.      Conjunctiva/sclera: Conjunctivae normal.      Pupils: Pupils are equal, round, and reactive to light.      Right eye: Pupil is round and reactive.      Left eye: Pupil is round and reactive.   Neck:      Trachea: Trachea normal.   Cardiovascular:      Rate and Rhythm: Normal rate and regular rhythm.      Pulses: Normal pulses.      Heart sounds: Normal heart sounds, S1 normal and S2 normal.   Pulmonary:      Effort: Pulmonary effort is normal. No respiratory distress.      Breath sounds: Normal breath sounds.   Abdominal:      General: Abdomen is flat. Bowel sounds are normal.      Palpations: Abdomen is soft. Abdomen is not rigid.      Tenderness: There is no guarding.   Musculoskeletal:         General: Normal range of motion.      Cervical back: Normal range of motion and neck supple. No muscular tenderness.   Lymphadenopathy:      Cervical: No cervical adenopathy.   Skin:     General: Skin is warm and dry.      Capillary Refill: Capillary refill takes less than 2 seconds.   Neurological:      General: No focal deficit present.      Mental Status: She is alert and oriented to person, place, and time.   Psychiatric:         Behavior: Behavior normal. Behavior is cooperative.         Thought Content: Thought content normal.         Cognition and Memory: Cognition normal.         Judgment: Judgment normal.      Comments: Discouraged with recent miscarriage           Assessment:       1. Mild hyperlipidemia    2. Mild intermittent reactive airway disease without complication        Plan:       Bhavna was seen today for asthma and hyperlipidemia.    Diagnoses and all orders for this visit:    Mild hyperlipidemia  -     Lipid Panel; Future  -     Lipid Panel    Mild intermittent reactive airway disease without complication  -     fluticasone-salmeterol diskus inhaler 250-50 mcg; Inhale 1 puff into the lungs 2 (two) times daily. Controller  -     albuterol (PROVENTIL/VENTOLIN HFA) 90  mcg/actuation inhaler; Inhale 2 puffs into the lungs every 6 (six) hours as needed for Wheezing. Rescue       Obtain labs within a month.  Will notify of labs when available and reviewed.   [] : Resident

## 2023-06-16 DIAGNOSIS — D27.0 BENIGN NEOPLASM OF RIGHT OVARY: Primary | ICD-10-CM

## 2023-06-30 ENCOUNTER — HOSPITAL ENCOUNTER (OUTPATIENT)
Dept: RADIOLOGY | Facility: HOSPITAL | Age: 38
Discharge: HOME | End: 2023-06-30
Attending: STUDENT IN AN ORGANIZED HEALTH CARE EDUCATION/TRAINING PROGRAM
Payer: MEDICAID

## 2023-06-30 DIAGNOSIS — D27.0 BENIGN NEOPLASM OF RIGHT OVARY: ICD-10-CM

## 2023-06-30 PROCEDURE — 76856 US EXAM PELVIC COMPLETE: CPT | Mod: TC,PO

## 2023-12-02 LAB
CHOLEST SERPL-MCNC: 202 MG/DL (ref 100–199)
HDLC SERPL-MCNC: 49 MG/DL
LDLC SERPL CALC-MCNC: 142 MG/DL (ref 0–99)
TRIGL SERPL-MCNC: 62 MG/DL (ref 0–149)
VLDLC SERPL CALC-MCNC: 11 MG/DL (ref 5–40)

## 2023-12-05 ENCOUNTER — OFFICE VISIT (OUTPATIENT)
Dept: FAMILY MEDICINE | Facility: CLINIC | Age: 38
End: 2023-12-05
Payer: MEDICAID

## 2023-12-05 VITALS
WEIGHT: 200 LBS | SYSTOLIC BLOOD PRESSURE: 120 MMHG | TEMPERATURE: 99 F | OXYGEN SATURATION: 99 % | HEART RATE: 83 BPM | BODY MASS INDEX: 34.15 KG/M2 | DIASTOLIC BLOOD PRESSURE: 84 MMHG | HEIGHT: 64 IN

## 2023-12-05 DIAGNOSIS — Z23 NEED FOR TDAP VACCINATION: ICD-10-CM

## 2023-12-05 DIAGNOSIS — Z00.00 ANNUAL PHYSICAL EXAM: Primary | ICD-10-CM

## 2023-12-05 DIAGNOSIS — Z13.1 DIABETES MELLITUS SCREENING: ICD-10-CM

## 2023-12-05 DIAGNOSIS — E78.5 MILD HYPERLIPIDEMIA: ICD-10-CM

## 2023-12-05 PROCEDURE — 3008F PR BODY MASS INDEX (BMI) DOCUMENTED: ICD-10-PCS | Mod: CPTII,,, | Performed by: NURSE PRACTITIONER

## 2023-12-05 PROCEDURE — 99395 PR PREVENTIVE VISIT,EST,18-39: ICD-10-PCS | Mod: S$PBB,,, | Performed by: NURSE PRACTITIONER

## 2023-12-05 PROCEDURE — 1159F MED LIST DOCD IN RCRD: CPT | Mod: CPTII,,, | Performed by: NURSE PRACTITIONER

## 2023-12-05 PROCEDURE — 1159F PR MEDICATION LIST DOCUMENTED IN MEDICAL RECORD: ICD-10-PCS | Mod: CPTII,,, | Performed by: NURSE PRACTITIONER

## 2023-12-05 PROCEDURE — 99999PBSHW TDAP VACCINE GREATER THAN OR EQUAL TO 7YO IM: ICD-10-PCS | Mod: PBBFAC,,,

## 2023-12-05 PROCEDURE — 1160F RVW MEDS BY RX/DR IN RCRD: CPT | Mod: CPTII,,, | Performed by: NURSE PRACTITIONER

## 2023-12-05 PROCEDURE — 3008F BODY MASS INDEX DOCD: CPT | Mod: CPTII,,, | Performed by: NURSE PRACTITIONER

## 2023-12-05 PROCEDURE — 99395 PREV VISIT EST AGE 18-39: CPT | Mod: S$PBB,,, | Performed by: NURSE PRACTITIONER

## 2023-12-05 PROCEDURE — 3079F DIAST BP 80-89 MM HG: CPT | Mod: CPTII,,, | Performed by: NURSE PRACTITIONER

## 2023-12-05 PROCEDURE — 3074F PR MOST RECENT SYSTOLIC BLOOD PRESSURE < 130 MM HG: ICD-10-PCS | Mod: CPTII,,, | Performed by: NURSE PRACTITIONER

## 2023-12-05 PROCEDURE — 99999PBSHW TDAP VACCINE GREATER THAN OR EQUAL TO 7YO IM: Mod: PBBFAC,,,

## 2023-12-05 PROCEDURE — 1160F PR REVIEW ALL MEDS BY PRESCRIBER/CLIN PHARMACIST DOCUMENTED: ICD-10-PCS | Mod: CPTII,,, | Performed by: NURSE PRACTITIONER

## 2023-12-05 PROCEDURE — 3079F PR MOST RECENT DIASTOLIC BLOOD PRESSURE 80-89 MM HG: ICD-10-PCS | Mod: CPTII,,, | Performed by: NURSE PRACTITIONER

## 2023-12-05 PROCEDURE — 99214 OFFICE O/P EST MOD 30 MIN: CPT | Performed by: NURSE PRACTITIONER

## 2023-12-05 PROCEDURE — 90715 TDAP VACCINE 7 YRS/> IM: CPT | Mod: PBBFAC | Performed by: NURSE PRACTITIONER

## 2023-12-05 PROCEDURE — 3074F SYST BP LT 130 MM HG: CPT | Mod: CPTII,,, | Performed by: NURSE PRACTITIONER

## 2023-12-05 PROCEDURE — 90471 IMMUNIZATION ADMIN: CPT | Mod: PBBFAC | Performed by: NURSE PRACTITIONER

## 2023-12-05 NOTE — PROGRESS NOTES
HPI: Bhavna Heart  is a 38 y.o. female who presents for annual physical .  No complaints at this time.     Review of Systems   Constitutional:  Negative for activity change, appetite change and fever.        Obesity   HENT:  Negative for congestion, ear discharge, ear pain, sore throat, trouble swallowing and voice change.    Eyes:  Negative for photophobia, pain, discharge and visual disturbance.   Respiratory:  Negative for cough, chest tightness and shortness of breath.    Cardiovascular:  Negative for chest pain and palpitations.        Mixed hyperlipidemia   Gastrointestinal:  Negative for abdominal pain, nausea and vomiting.   Endocrine: Negative for cold intolerance and heat intolerance.   Genitourinary:  Negative for difficulty urinating and dysuria.   Musculoskeletal:  Negative for arthralgias and gait problem.   Skin:  Negative for rash.   Allergic/Immunologic: Negative for immunocompromised state.   Neurological:  Negative for speech difficulty and headaches.   Psychiatric/Behavioral:  Negative for confusion, self-injury and suicidal ideas.      Review of patient's allergies indicates:   Allergen Reactions    Doxycycline Nausea Only     History reviewed. No pertinent past medical history.  History reviewed. No pertinent surgical history.  Family History   Problem Relation Age of Onset    No Known Problems Mother     Asthma Father     Asthma Maternal Aunt     Cancer Paternal Uncle     Diabetes Maternal Grandmother     Thyroid disease Maternal Grandmother     Cancer Maternal Grandfather     Kidney disease Maternal Grandfather     Heart disease Paternal Grandfather     Stroke Paternal Grandfather      Social History     Tobacco Use    Smoking status: Former     Current packs/day: 0.00     Types: Cigarettes     Quit date: 2022     Years since quittin.8    Smokeless tobacco: Never   Substance Use Topics    Alcohol use: No    Drug use: No      Health Maintenance Topics with due status:  Not Due       Topic Last Completion Date    TETANUS VACCINE 12/05/2023     Immunization History   Administered Date(s) Administered    COVID-19, MRNA, LN-S, PF (Pfizer) ([place]) 03/14/2022    DT (Pediatric) 01/07/2003    DTP 1987, 1987, 01/21/1988    DTaP 11/10/1992    Hepatitis B, Pediatric/Adolescent 08/18/1998, 10/03/1998, 03/20/1999    MMR 11/10/1992    OPV 1987, 1987, 01/05/1988, 11/10/1992    Tdap 12/05/2023     OBJECTIVE:      Vitals:    12/05/23 0919   BP: 120/84   Pulse: 83   Temp: 99 °F (37.2 °C)     Physical Exam  Vitals and nursing note reviewed.   Constitutional:       General: She is not in acute distress.     Appearance: Normal appearance. She is well-developed. She is obese.   HENT:      Head: Normocephalic and atraumatic.      Right Ear: Tympanic membrane, ear canal and external ear normal.      Left Ear: Tympanic membrane, ear canal and external ear normal.      Nose: Nose normal.      Mouth/Throat:      Mouth: Mucous membranes are moist.   Eyes:      General: Lids are normal. Lids are everted, no foreign bodies appreciated.      Conjunctiva/sclera: Conjunctivae normal.      Pupils: Pupils are equal, round, and reactive to light.      Right eye: Pupil is round and reactive.      Left eye: Pupil is round and reactive.   Neck:      Trachea: Trachea normal.   Cardiovascular:      Rate and Rhythm: Normal rate and regular rhythm.      Pulses: Normal pulses.      Heart sounds: Normal heart sounds, S1 normal and S2 normal.   Pulmonary:      Effort: Pulmonary effort is normal. No respiratory distress.      Breath sounds: Normal breath sounds.   Abdominal:      General: Abdomen is flat. Bowel sounds are normal.      Palpations: Abdomen is soft. Abdomen is not rigid.      Tenderness: There is no guarding.   Musculoskeletal:         General: Normal range of motion.      Cervical back: Normal range of motion and neck supple. No muscular tenderness.   Lymphadenopathy:      Cervical: No  cervical adenopathy.   Skin:     General: Skin is warm and dry.      Capillary Refill: Capillary refill takes less than 2 seconds.   Neurological:      General: No focal deficit present.      Mental Status: She is alert and oriented to person, place, and time.      Cranial Nerves: No cranial nerve deficit.   Psychiatric:         Mood and Affect: Mood normal.         Behavior: Behavior normal. Behavior is cooperative.         Thought Content: Thought content normal.         Judgment: Judgment normal.        Assessment:       1. Annual physical exam    2. Mild hyperlipidemia    3. Diabetes mellitus screening    4. Need for Tdap vaccination        Plan:       Annual physical exam  Completed    Mild hyperlipidemia  -     Lipid Panel; Future; Expected date: 06/05/2024    Diabetes mellitus screening  -     Comprehensive Metabolic Panel; Future; Expected date: 06/05/2024    Need for Tdap vaccination  -     Tdap Vaccine        Patient counseled on age appropriate medical preventative services, age appropriate cancer screenings, nutrition, healthy diet, consistent exercise regimen and maintaining an active lifestyle.      Counseled on age appropriate vaccines and discussed upcoming health care needs based on age/gender.  Spent time with patient counseling on need for a good patient/doctor relationship moving forward.  Discussed use of common OTC medications and supplements.  Discussed common dietary aids and use of caffeine and the need for good sleep hygiene and stress management.    Follow up in about 6 months (around 6/5/2024) for lab review, hyperlipidemia.      12/5/2023 KOFI Ba, SUSSYP-C

## 2023-12-30 DIAGNOSIS — J45.20 MILD INTERMITTENT REACTIVE AIRWAY DISEASE WITHOUT COMPLICATION: ICD-10-CM

## 2024-01-03 RX ORDER — ALBUTEROL SULFATE 90 UG/1
2 AEROSOL, METERED RESPIRATORY (INHALATION) EVERY 6 HOURS PRN
Qty: 18 G | Refills: 5 | Status: SHIPPED | OUTPATIENT
Start: 2024-01-03

## 2024-02-26 DIAGNOSIS — J45.20 MILD INTERMITTENT REACTIVE AIRWAY DISEASE WITHOUT COMPLICATION: ICD-10-CM

## 2024-02-27 RX ORDER — FLUTICASONE PROPIONATE AND SALMETEROL 250; 50 UG/1; UG/1
1 POWDER RESPIRATORY (INHALATION) 2 TIMES DAILY
Qty: 60 EACH | Refills: 5
Start: 2024-02-27 | End: 2025-02-26

## 2024-07-03 ENCOUNTER — OFFICE VISIT (OUTPATIENT)
Dept: FAMILY MEDICINE | Facility: CLINIC | Age: 39
End: 2024-07-03
Payer: MEDICAID

## 2024-07-03 VITALS
HEART RATE: 91 BPM | BODY MASS INDEX: 33.63 KG/M2 | TEMPERATURE: 99 F | WEIGHT: 197 LBS | DIASTOLIC BLOOD PRESSURE: 70 MMHG | HEIGHT: 64 IN | SYSTOLIC BLOOD PRESSURE: 118 MMHG | OXYGEN SATURATION: 97 %

## 2024-07-03 DIAGNOSIS — J06.9 UPPER RESPIRATORY TRACT INFECTION, UNSPECIFIED TYPE: ICD-10-CM

## 2024-07-03 DIAGNOSIS — R50.9 FEVER, UNSPECIFIED FEVER CAUSE: ICD-10-CM

## 2024-07-03 DIAGNOSIS — J45.20 MILD INTERMITTENT REACTIVE AIRWAY DISEASE WITHOUT COMPLICATION: ICD-10-CM

## 2024-07-03 DIAGNOSIS — R05.1 ACUTE COUGH: Primary | ICD-10-CM

## 2024-07-03 PROCEDURE — 99213 OFFICE O/P EST LOW 20 MIN: CPT | Mod: S$PBB,,, | Performed by: NURSE PRACTITIONER

## 2024-07-03 PROCEDURE — 3074F SYST BP LT 130 MM HG: CPT | Mod: CPTII,,, | Performed by: NURSE PRACTITIONER

## 2024-07-03 PROCEDURE — 99999 PR PBB SHADOW E&M-EST. PATIENT-LVL III: CPT | Mod: PBBFAC,,, | Performed by: NURSE PRACTITIONER

## 2024-07-03 PROCEDURE — 99213 OFFICE O/P EST LOW 20 MIN: CPT | Mod: PBBFAC,PN | Performed by: NURSE PRACTITIONER

## 2024-07-03 PROCEDURE — 3008F BODY MASS INDEX DOCD: CPT | Mod: CPTII,,, | Performed by: NURSE PRACTITIONER

## 2024-07-03 PROCEDURE — 3078F DIAST BP <80 MM HG: CPT | Mod: CPTII,,, | Performed by: NURSE PRACTITIONER

## 2024-07-03 PROCEDURE — 1160F RVW MEDS BY RX/DR IN RCRD: CPT | Mod: CPTII,,, | Performed by: NURSE PRACTITIONER

## 2024-07-03 PROCEDURE — 1159F MED LIST DOCD IN RCRD: CPT | Mod: CPTII,,, | Performed by: NURSE PRACTITIONER

## 2024-07-03 RX ORDER — FLUTICASONE PROPIONATE AND SALMETEROL 250; 50 UG/1; UG/1
1 POWDER RESPIRATORY (INHALATION) 2 TIMES DAILY
Qty: 60 EACH | Refills: 5 | Status: SHIPPED | OUTPATIENT
Start: 2024-07-03 | End: 2025-07-03

## 2024-07-03 RX ORDER — AMOXICILLIN AND CLAVULANATE POTASSIUM 875; 125 MG/1; MG/1
1 TABLET, FILM COATED ORAL 2 TIMES DAILY
Qty: 20 TABLET | Refills: 0 | Status: SHIPPED | OUTPATIENT
Start: 2024-07-03

## 2024-07-03 RX ORDER — PROMETHAZINE HYDROCHLORIDE AND DEXTROMETHORPHAN HYDROBROMIDE 6.25; 15 MG/5ML; MG/5ML
5 SYRUP ORAL NIGHTLY PRN
Qty: 118 ML | Refills: 0 | Status: SHIPPED | OUTPATIENT
Start: 2024-07-03

## 2024-07-03 RX ORDER — ALBUTEROL SULFATE 90 UG/1
2 AEROSOL, METERED RESPIRATORY (INHALATION) EVERY 6 HOURS PRN
Qty: 18 G | Refills: 5 | Status: SHIPPED | OUTPATIENT
Start: 2024-07-03

## 2024-07-03 NOTE — PROGRESS NOTES
Subjective:       Patient ID: Bhavna Heart is a 39 y.o. female.    Chief Complaint: Sore Throat, Fever, Cough, and Fatigue    Cough  This is a new problem. The current episode started in the past 7 days. The problem has been waxing and waning. The problem occurs constantly. The cough is Productive of sputum. Associated symptoms include chest pain, chills, ear pain, a fever, headaches, postnasal drip, a sore throat, shortness of breath and wheezing. Pertinent negatives include no rash. The symptoms are aggravated by lying down. She has tried OTC cough suppressant and body position changes for the symptoms. The treatment provided no relief. Her past medical history is significant for asthma.     Review of Systems   Constitutional:  Positive for activity change, chills, fatigue and fever. Negative for appetite change.   HENT:  Positive for ear pain, postnasal drip and sore throat. Negative for congestion, ear discharge, trouble swallowing and voice change.    Eyes:  Negative for photophobia, pain, discharge and visual disturbance.   Respiratory:  Positive for cough, shortness of breath and wheezing. Negative for chest tightness.    Cardiovascular:  Positive for chest pain. Negative for palpitations.   Gastrointestinal:  Negative for abdominal pain, nausea and vomiting.   Endocrine: Negative for cold intolerance and heat intolerance.   Genitourinary:  Negative for difficulty urinating and dysuria.   Musculoskeletal:  Negative for arthralgias and gait problem.   Skin:  Negative for rash.   Allergic/Immunologic: Negative for immunocompromised state.   Neurological:  Positive for headaches. Negative for speech difficulty.   Psychiatric/Behavioral:  Negative for confusion, self-injury and suicidal ideas.      History reviewed. No pertinent past medical history. History reviewed. No pertinent surgical history.    Family History   Problem Relation Name Age of Onset    No Known Problems Mother      Asthma Father       "Asthma Maternal Aunt      Cancer Paternal Uncle      Diabetes Maternal Grandmother      Thyroid disease Maternal Grandmother      Cancer Maternal Grandfather      Kidney disease Maternal Grandfather      Heart disease Paternal Grandfather      Stroke Paternal Grandfather         Social History     Socioeconomic History    Marital status:    Tobacco Use    Smoking status: Former     Current packs/day: 0.00     Types: Cigarettes     Quit date: 2022     Years since quittin.4    Smokeless tobacco: Never   Substance and Sexual Activity    Alcohol use: No    Drug use: No    Sexual activity: Yes     Partners: Male       Current Outpatient Medications   Medication Sig Dispense Refill    albuterol (PROVENTIL/VENTOLIN HFA) 90 mcg/actuation inhaler Inhale 2 puffs into the lungs every 6 (six) hours as needed for Wheezing. Rescue 18 g 5    amoxicillin-clavulanate 875-125mg (AUGMENTIN) 875-125 mg per tablet Take 1 tablet by mouth 2 (two) times daily. 20 tablet 0    fluticasone-salmeterol diskus inhaler 250-50 mcg Inhale 1 puff into the lungs 2 (two) times daily. Controller 60 each 5    promethazine-dextromethorphan (PROMETHAZINE-DM) 6.25-15 mg/5 mL Syrp Take 5 mLs by mouth nightly as needed (cough). 118 mL 0     No current facility-administered medications for this visit.       Review of patient's allergies indicates:   Allergen Reactions    Doxycycline Nausea Only     Objective:      Blood pressure 118/70, pulse 91, temperature 98.5 °F (36.9 °C), height 5' 4" (1.626 m), weight 89.4 kg (197 lb), SpO2 97%, unknown if currently breastfeeding. Body mass index is 33.81 kg/m².   Physical Exam  Vitals and nursing note reviewed.   Constitutional:       General: She is not in acute distress.     Appearance: Normal appearance. She is well-developed.   HENT:      Head: Normocephalic and atraumatic.      Right Ear: External ear normal. A middle ear effusion is present. There is no impacted cerumen. Tympanic membrane is " bulging.      Left Ear: External ear normal. A middle ear effusion is present. There is no impacted cerumen. Tympanic membrane is bulging.      Nose:      Right Turbinates: Enlarged.      Left Turbinates: Enlarged.      Right Sinus: Maxillary sinus tenderness present.      Left Sinus: Maxillary sinus tenderness present.      Mouth/Throat:      Mouth: Mucous membranes are moist.      Pharynx: Oropharyngeal exudate (post nasal drainage) present. No pharyngeal swelling or posterior oropharyngeal erythema.   Eyes:      General: Lids are normal. Lids are everted, no foreign bodies appreciated.      Conjunctiva/sclera: Conjunctivae normal.      Pupils: Pupils are equal, round, and reactive to light.      Right eye: Pupil is round and reactive.      Left eye: Pupil is round and reactive.   Neck:      Trachea: Trachea normal.   Cardiovascular:      Rate and Rhythm: Normal rate and regular rhythm.      Pulses: Normal pulses.      Heart sounds: Normal heart sounds, S1 normal and S2 normal.   Pulmonary:      Effort: Pulmonary effort is normal.      Breath sounds: Normal breath sounds.   Abdominal:      General: Abdomen is flat. Bowel sounds are normal.      Palpations: Abdomen is soft. Abdomen is not rigid.      Tenderness: There is no guarding.   Musculoskeletal:         General: Normal range of motion.      Cervical back: Normal range of motion and neck supple. No muscular tenderness.   Lymphadenopathy:      Cervical: No cervical adenopathy.   Skin:     General: Skin is warm and dry.      Capillary Refill: Capillary refill takes less than 2 seconds.   Neurological:      General: No focal deficit present.      Mental Status: She is alert and oriented to person, place, and time.   Psychiatric:         Mood and Affect: Mood normal.         Behavior: Behavior normal. Behavior is cooperative.         Thought Content: Thought content normal.         Judgment: Judgment normal.             Assessment:       1. Acute cough    2.  Fever, unspecified fever cause    3. Mild intermittent reactive airway disease without complication    4. Upper respiratory tract infection, unspecified type        Plan:       Bhavna was seen today for sore throat, fever, cough and fatigue.    Diagnoses and all orders for this visit:    Acute cough  -     Cancel: POCT Influenza A/B Molecular  -     Cancel: POCT COVID-19 Rapid Screening  -     promethazine-dextromethorphan (PROMETHAZINE-DM) 6.25-15 mg/5 mL Syrp; Take 5 mLs by mouth nightly as needed (cough).    Fever, unspecified fever cause  -     Cancel: POCT Influenza A/B Molecular  -     Cancel: POCT COVID-19 Rapid Screening    Mild intermittent reactive airway disease without complication  -     fluticasone-salmeterol diskus inhaler 250-50 mcg; Inhale 1 puff into the lungs 2 (two) times daily. Controller  -     albuterol (PROVENTIL/VENTOLIN HFA) 90 mcg/actuation inhaler; Inhale 2 puffs into the lungs every 6 (six) hours as needed for Wheezing. Rescue    Upper respiratory tract infection, unspecified type  -     amoxicillin-clavulanate 875-125mg (AUGMENTIN) 875-125 mg per tablet; Take 1 tablet by mouth 2 (two) times daily.         Follow-up If not improving in 1-2 weeks

## 2024-08-19 ENCOUNTER — TELEPHONE (OUTPATIENT)
Dept: FAMILY MEDICINE | Facility: CLINIC | Age: 39
End: 2024-08-19
Payer: MEDICAID

## 2024-08-19 DIAGNOSIS — E78.5 MILD HYPERLIPIDEMIA: ICD-10-CM

## 2024-08-19 DIAGNOSIS — Z13.1 DIABETES MELLITUS SCREENING: Primary | ICD-10-CM

## 2024-08-26 ENCOUNTER — OFFICE VISIT (OUTPATIENT)
Dept: FAMILY MEDICINE | Facility: CLINIC | Age: 39
End: 2024-08-26
Payer: MEDICAID

## 2024-08-26 VITALS
WEIGHT: 206 LBS | DIASTOLIC BLOOD PRESSURE: 80 MMHG | TEMPERATURE: 98 F | BODY MASS INDEX: 35.17 KG/M2 | HEART RATE: 79 BPM | HEIGHT: 64 IN | SYSTOLIC BLOOD PRESSURE: 130 MMHG | OXYGEN SATURATION: 98 %

## 2024-08-26 DIAGNOSIS — E66.01 CLASS 2 SEVERE OBESITY DUE TO EXCESS CALORIES WITH SERIOUS COMORBIDITY AND BODY MASS INDEX (BMI) OF 35.0 TO 35.9 IN ADULT: ICD-10-CM

## 2024-08-26 DIAGNOSIS — E78.5 MILD HYPERLIPIDEMIA: Primary | ICD-10-CM

## 2024-08-26 DIAGNOSIS — J45.20 MILD INTERMITTENT REACTIVE AIRWAY DISEASE WITHOUT COMPLICATION: ICD-10-CM

## 2024-08-26 PROCEDURE — 3079F DIAST BP 80-89 MM HG: CPT | Mod: CPTII,,, | Performed by: NURSE PRACTITIONER

## 2024-08-26 PROCEDURE — 99999 PR PBB SHADOW E&M-EST. PATIENT-LVL III: CPT | Mod: PBBFAC,,, | Performed by: NURSE PRACTITIONER

## 2024-08-26 PROCEDURE — 3075F SYST BP GE 130 - 139MM HG: CPT | Mod: CPTII,,, | Performed by: NURSE PRACTITIONER

## 2024-08-26 PROCEDURE — 1160F RVW MEDS BY RX/DR IN RCRD: CPT | Mod: CPTII,,, | Performed by: NURSE PRACTITIONER

## 2024-08-26 PROCEDURE — 1159F MED LIST DOCD IN RCRD: CPT | Mod: CPTII,,, | Performed by: NURSE PRACTITIONER

## 2024-08-26 PROCEDURE — 99213 OFFICE O/P EST LOW 20 MIN: CPT | Mod: S$PBB,,, | Performed by: NURSE PRACTITIONER

## 2024-08-26 PROCEDURE — 99213 OFFICE O/P EST LOW 20 MIN: CPT | Mod: PBBFAC,PN | Performed by: NURSE PRACTITIONER

## 2024-08-26 PROCEDURE — 3008F BODY MASS INDEX DOCD: CPT | Mod: CPTII,,, | Performed by: NURSE PRACTITIONER

## 2024-08-26 NOTE — PROGRESS NOTES
Subjective:       Patient ID: Bhavna Heart is a 39 y.o. female.    Chief Complaint: Hyperlipidemia and Follow-up    Patient presents today for follow-up on asthma and high cholesterol.  She did complete her labs and they will be reviewed in office today.  Cholesterol is elevated and will be discussed.  Patient is not on any medication currently for cholesterol.  She did start an exercise program about 3 weeks ago and is trying to lose weight.  Patient is obese with a BMI of 35.36    Asthma  She complains of wheezing. There is no chest tightness, cough, hemoptysis, hoarse voice or shortness of breath. The current episode started more than 1 month ago. Asthma causes daytime symptoms weekly. Asthma causes nighttime symptoms weekly. The problem has been waxing and waning. Associated symptoms include nasal congestion. Pertinent negatives include no appetite change, chest pain, ear pain, fever, headaches, sore throat or trouble swallowing. Her symptoms are aggravated by pollen, strenuous activity and exposure to smoke. Her symptoms are alleviated by rest and beta-agonist. She reports moderate improvement on treatment. Her symptoms are not alleviated by rest and beta-agonist. Her past medical history is significant for asthma.   Hyperlipidemia  This is a chronic problem. The current episode started more than 1 month ago. The problem is uncontrolled. Recent lipid tests were reviewed and are variable. Exacerbating diseases include obesity. She has no history of hypothyroidism. Factors aggravating her hyperlipidemia include fatty foods. Pertinent negatives include no chest pain, focal weakness or shortness of breath. She is currently on no antihyperlipidemic treatment. The current treatment provides no improvement of lipids. Compliance problems include adherence to diet.  Risk factors for coronary artery disease include dyslipidemia and stress.     Review of Systems   Constitutional:  Negative for activity change,  appetite change and fever.        Obesity   HENT:  Negative for congestion, ear discharge, ear pain, hoarse voice, sore throat, trouble swallowing and voice change.    Eyes:  Negative for photophobia, pain, discharge and visual disturbance.   Respiratory:  Positive for wheezing. Negative for cough, hemoptysis, chest tightness and shortness of breath.    Cardiovascular:  Negative for chest pain and palpitations.        Mild hyperlipidemia   Gastrointestinal:  Negative for abdominal pain, nausea and vomiting.   Endocrine: Negative for cold intolerance and heat intolerance.   Genitourinary:  Negative for difficulty urinating and dysuria.        Recent miscarriage   Musculoskeletal:  Negative for arthralgias and gait problem.   Skin:  Negative for rash.   Allergic/Immunologic: Negative for immunocompromised state.   Neurological:  Negative for focal weakness, speech difficulty and headaches.   Psychiatric/Behavioral:  Negative for confusion, self-injury and suicidal ideas.      History reviewed. No pertinent past medical history. History reviewed. No pertinent surgical history.    Family History   Problem Relation Name Age of Onset    No Known Problems Mother      Asthma Father      Asthma Maternal Aunt      Cancer Paternal Uncle      Diabetes Maternal Grandmother      Thyroid disease Maternal Grandmother      Cancer Maternal Grandfather      Kidney disease Maternal Grandfather      Heart disease Paternal Grandfather      Stroke Paternal Grandfather         Social History     Socioeconomic History    Marital status:    Tobacco Use    Smoking status: Former     Current packs/day: 0.00     Types: Cigarettes     Quit date: 2022     Years since quittin.5    Smokeless tobacco: Never   Substance and Sexual Activity    Alcohol use: No    Drug use: No    Sexual activity: Yes     Partners: Male       Current Outpatient Medications   Medication Sig Dispense Refill    albuterol (PROVENTIL/VENTOLIN HFA) 90  "mcg/actuation inhaler Inhale 2 puffs into the lungs every 6 (six) hours as needed for Wheezing. Rescue 18 g 5    fluticasone-salmeterol diskus inhaler 250-50 mcg Inhale 1 puff into the lungs 2 (two) times daily. Controller 60 each 5    promethazine-dextromethorphan (PROMETHAZINE-DM) 6.25-15 mg/5 mL Syrp Take 5 mLs by mouth nightly as needed (cough). 118 mL 0     No current facility-administered medications for this visit.       Review of patient's allergies indicates:   Allergen Reactions    Doxycycline Nausea Only     Objective:      Blood pressure 130/80, pulse 79, temperature 98 °F (36.7 °C), height 5' 4" (1.626 m), weight 93.4 kg (206 lb), last menstrual period 08/12/2024, SpO2 98%, unknown if currently breastfeeding. Body mass index is 35.36 kg/m².   Physical Exam  Vitals and nursing note reviewed.   Constitutional:       General: She is not in acute distress.     Appearance: Normal appearance. She is well-developed. She is obese.   HENT:      Head: Normocephalic and atraumatic.      Right Ear: Tympanic membrane, ear canal and external ear normal.      Left Ear: Tympanic membrane, ear canal and external ear normal.      Nose: Nose normal.      Mouth/Throat:      Mouth: Mucous membranes are moist.   Eyes:      General: Lids are normal. Lids are everted, no foreign bodies appreciated.      Conjunctiva/sclera: Conjunctivae normal.      Pupils: Pupils are equal, round, and reactive to light.      Right eye: Pupil is round and reactive.      Left eye: Pupil is round and reactive.   Neck:      Trachea: Trachea normal.   Cardiovascular:      Rate and Rhythm: Normal rate and regular rhythm.      Pulses: Normal pulses.      Heart sounds: Normal heart sounds, S1 normal and S2 normal.   Pulmonary:      Effort: Pulmonary effort is normal. No respiratory distress.      Breath sounds: Normal breath sounds. No wheezing.   Abdominal:      General: Abdomen is flat. Bowel sounds are normal.      Palpations: Abdomen is soft. " Abdomen is not rigid.      Tenderness: There is no guarding.   Musculoskeletal:         General: Normal range of motion.      Cervical back: Normal range of motion and neck supple. No muscular tenderness.   Lymphadenopathy:      Cervical: No cervical adenopathy.   Skin:     General: Skin is warm and dry.      Capillary Refill: Capillary refill takes less than 2 seconds.   Neurological:      General: No focal deficit present.      Mental Status: She is alert and oriented to person, place, and time.   Psychiatric:         Behavior: Behavior normal. Behavior is cooperative.         Thought Content: Thought content normal.         Cognition and Memory: Cognition normal.         Judgment: Judgment normal.      Comments: Discouraged with recent miscarriage             Assessment:       1. Mild hyperlipidemia    2. Mild intermittent reactive airway disease without complication    3. Class 2 severe obesity due to excess calories with serious comorbidity and body mass index (BMI) of 35.0 to 35.9 in adult          Plan:       Bhavna was seen today for hyperlipidemia and follow-up.    Diagnoses and all orders for this visit:    Mild hyperlipidemia  -     Lipid Panel; Future  -     Lipid Panel  Try niacin over-the-counter.  Increase fiber intake.  Weight loss advised to help with cholesterol.    Mild intermittent reactive airway disease without complication  -     CBC Auto Differential; Future  -     Comprehensive Metabolic Panel; Future  -     CBC Auto Differential  -     Comprehensive Metabolic Panel    Class 2 severe obesity due to excess calories with serious comorbidity and body mass index (BMI) of 35.0 to 35.9 in adult  The patient is asked to make an attempt to improve diet and exercise patterns to aid in medical management of this problem.         Follow-up in 6 months for annual wellness with labs prior to office visit.

## 2025-01-02 ENCOUNTER — PATIENT MESSAGE (OUTPATIENT)
Dept: ADMINISTRATIVE | Facility: HOSPITAL | Age: 40
End: 2025-01-02
Payer: MEDICAID

## 2025-01-15 DIAGNOSIS — J45.20 MILD INTERMITTENT REACTIVE AIRWAY DISEASE WITHOUT COMPLICATION: ICD-10-CM

## 2025-01-16 RX ORDER — ALBUTEROL SULFATE 90 UG/1
INHALANT RESPIRATORY (INHALATION)
Qty: 6.7 G | Refills: 1 | Status: SHIPPED | OUTPATIENT
Start: 2025-01-16

## 2025-02-19 ENCOUNTER — PATIENT MESSAGE (OUTPATIENT)
Dept: FAMILY MEDICINE | Facility: CLINIC | Age: 40
End: 2025-02-19
Payer: MEDICAID

## 2025-02-26 ENCOUNTER — OFFICE VISIT (OUTPATIENT)
Dept: FAMILY MEDICINE | Facility: CLINIC | Age: 40
End: 2025-02-26
Payer: MEDICAID

## 2025-02-26 VITALS
SYSTOLIC BLOOD PRESSURE: 112 MMHG | HEART RATE: 69 BPM | DIASTOLIC BLOOD PRESSURE: 78 MMHG | WEIGHT: 212 LBS | BODY MASS INDEX: 36.19 KG/M2 | OXYGEN SATURATION: 99 % | HEIGHT: 64 IN | TEMPERATURE: 98 F

## 2025-02-26 DIAGNOSIS — Z00.00 ANNUAL PHYSICAL EXAM: Primary | ICD-10-CM

## 2025-02-26 DIAGNOSIS — J30.1 NON-SEASONAL ALLERGIC RHINITIS DUE TO POLLEN: ICD-10-CM

## 2025-02-26 DIAGNOSIS — Z01.419 ENCOUNTER FOR WELL WOMAN EXAM WITH ROUTINE GYNECOLOGICAL EXAM: ICD-10-CM

## 2025-02-26 PROCEDURE — 99999 PR PBB SHADOW E&M-EST. PATIENT-LVL III: CPT | Mod: PBBFAC,,, | Performed by: NURSE PRACTITIONER

## 2025-02-26 PROCEDURE — 99213 OFFICE O/P EST LOW 20 MIN: CPT | Mod: PBBFAC,PN | Performed by: NURSE PRACTITIONER

## 2025-02-26 RX ORDER — MONTELUKAST SODIUM 10 MG/1
10 TABLET ORAL NIGHTLY
Qty: 30 TABLET | Refills: 0 | Status: SHIPPED | OUTPATIENT
Start: 2025-02-26 | End: 2025-03-28

## 2025-02-26 NOTE — PROGRESS NOTES
HPI: Bhavna Heart  is a 39 y.o. female who presents for annual physical .  No complaints at this time.  History of Present Illness    CHIEF COMPLAINT:  Patient presents for an annual wellness visit and to discuss recent labs results.    HPI:  Patient reports ongoing allergy symptoms, including persistent nasal congestion and eye swelling when exposed to cats. She has tried various OTC allergy medications with limited efficacy. Benadryl is used for severe symptoms, such as eye swelling or random swelling breakouts. Zyrtec was ineffective, and she currently uses generic Claritin with partial relief. Allegra has been more beneficial but does not completely alleviate symptoms.    She previously used nasal sprays, including Astelin and Flonase, but discontinued due to frequent nosebleeds. She now uses Flonase only for severe congestion. Daily nasal saline rinses were attempted but found uncomfortable.    Her allergy symptoms began after relocating from [place], an area with less vegetation. Prior to the move, she did not require allergy medication or use an inhaler.    For cholesterol management, she has increased fish consumption, particularly tuna and salmon, and started drinking turmeric tea, which she reports has significantly reduced inflammation.    She denies any formal medical diagnoses.    MEDICATIONS:  Patient is on generic Claritin, which is partially effective for allergies. She takes Benadryl as needed for eye swelling or random swelling breakouts. She recently restarted Niacin for cholesterol management and drinks turmeric tea for inflammation. Patient discontinued Flonase and Astelin nasal sprays due to nosebleeds, as well as regular use of nasal saline spray.    TEST RESULTS:  Patient's recent CBC showed all values within normal limits, with eosinophils slightly elevated but improved from previous. Her recent cholesterol test showed overall improvement, with LDL decreased and HDL slightly  decreased from 42 to 40. Her recent blood sugar was 84, a decrease of 4 points from the previous test. Both liver and kidney function tests were within normal limits.    ALLERGIES:  Patient has an allergy to cats, which causes eye swelling. She has a possible allergy to Singulair (montelukast), manifesting as a rash, though this is unconfirmed. She also experiences environmental allergies, leading to difficulty breathing and sinus problems.      ROS:  General: -fever, -chills, -fatigue, -weight gain, -weight loss  Eyes: -vision changes, -redness, -discharge, +eye swelling, -eye pain  ENT: -ear pain, +nasal congestion, -sore throat, +sinus pressure  Cardiovascular: -chest pain, -palpitations, -lower extremity edema  Respiratory: -cough, -shortness of breath, +difficulty breathing  Gastrointestinal: -abdominal pain, -nausea, -vomiting, -diarrhea, -constipation, -blood in stool  Genitourinary: -dysuria, -hematuria, -frequency  Musculoskeletal: -joint pain, -muscle pain  Skin: -rash, -lesion  Neurological: -headache, -dizziness, -numbness, -tingling  Psychiatric: -anxiety, -depression, -sleep difficulty         Review of patient's allergies indicates:   Allergen Reactions    Doxycycline Nausea Only     History reviewed. No pertinent past medical history.  History reviewed. No pertinent surgical history.  Family History   Problem Relation Name Age of Onset    No Known Problems Mother      Asthma Father      Asthma Maternal Aunt      Cancer Paternal Uncle      Diabetes Maternal Grandmother      Thyroid disease Maternal Grandmother      Cancer Maternal Grandfather      Kidney disease Maternal Grandfather      Heart disease Paternal Grandfather      Stroke Paternal Grandfather       Social History[1]   Health Maintenance Topics with due status: Not Due       Topic Last Completion Date    TETANUS VACCINE 12/05/2023    RSV Vaccine (Age 60+ and Pregnant patients) Not Due     Immunization History   Administered Date(s)  Administered    COVID-19, MRNA, LN-S, PF (Pfizer) ([place]) 03/14/2022    DT (Pediatric) 01/07/2003    DTP 1987, 1987, 01/21/1988    DTaP 11/10/1992    Hepatitis B, Pediatric/Adolescent 08/18/1998, 10/03/1998, 03/20/1999    MMR 11/10/1992    OPV 1987, 1987, 01/05/1988, 11/10/1992    Tdap 12/05/2023     OBJECTIVE:      Vitals:    02/26/25 1029   BP: 112/78   Pulse: 69   Temp: 98.1 °F (36.7 °C)     Physical Exam    General: No acute distress. Well-developed. Well-nourished.  Eyes: EOMI. Sclerae anicteric.  HENT: Normocephalic. Atraumatic. Nares patent. Moist oral mucosa.  Ears: Bilateral TMs clear. Bilateral EACs clear.  Cardiovascular: Regular rate. Regular rhythm. No murmurs. No rubs. No gallops. Normal S1, S2.  Respiratory: Normal respiratory effort. Clear to auscultation bilaterally. No rales. No rhonchi. No wheezing.  Abdomen: Soft. Non-tender. Non-distended. Normoactive bowel sounds.  Musculoskeletal: No  obvious deformity.  Extremities: No lower extremity edema.  Neurological: Alert & oriented x3. No slurred speech. Normal gait.  Psychiatric: Normal mood. Normal affect. Good insight. Good judgment.  Skin: Warm. Dry. No rash.  Lymphatics: Lymph nodes slightly enlarged.         Assessment:       Assessment & Plan    - Reviewed CBC results: eosinophils slightly elevated, likely related to allergy season  - Assessed cholesterol levels: LDL improved, HDL slightly decreased  - Considered alternative allergy treatments due to patient's history of epistaxis with nasal sprays  - Evaluated effectiveness of current allergy medications and patient's reported symptoms  - Will trial Singulair for allergy management, despite patient's uncertain history of previous cutaneous reaction    ALLERGIES:  - Explained different types of antihistamines and their durations of action.  - Provided information on nasal saline irrigation for allergy symptom relief.  - Discussed the process and purpose of allergy  shots.  - Recommend OTC nasal saline spray for daily use to rinse out allergens (e.g. Simply Saline).  - Initiated a 30-day trial of Singulair for allergy management, to be taken at bedtime.  - Continued Claritin (generic) for allergy symptoms.  - Patient reports suffering from allergies and difficulty breathing through the nose.  - Discussed various allergy medications and their effectiveness, including Zyrtec, Claritin, and Allegra.  - Suggested considering allergy shots if symptoms worsen.    EOSINOPHILIA:  - Noted that eosinophils are slightly elevated but improved from last time, indicating active allergies.  - Noted slightly elevated eosinophil levels in recent labs, likely related to allergy season.  - Observed that eosinophil levels are elevated but have improved since the last test.  - Associated elevated eosinophil levels with patient's allergy symptoms.  - Recommend allergy treatments to manage symptoms, which may indirectly address eosinophilia.    CAT ALLERGY:  - Patient reports eye swelling when exposed to cats.  - Advised the patient to continue taking Benadryl for eye swelling due to cat allergy.    EYE SWELLING:  - Patient reports eye swelling, possibly related to allergies.  - Recommend continued use of Benadryl for eye swelling.    HYPERLIPIDEMIA:  - Discussed benefits of fatty fish (e.g. salmon) for cholesterol management.  - Reviewed patient's cholesterol levels from recent labs.  - Noted improvement in cholesterol levels, with LDL (bad cholesterol) decreasing.  - HDL (good cholesterol) decreased from 42 to 40.  - Recommend retesting cholesterol levels in 6 months to monitor progress.  - Advised niacin supplementation to help lower LDL and increase HDL.  - Suggested consuming good fatty acids like fish oil or olive oil.  - Recommend continuing to eat fish, especially fatty fish like salmon, to help lower cholesterol levels.  - Recommend incorporating more fatty fish like salmon into diet for  cholesterol management.  - Patient to continue consuming lean fish like tuna for overall health benefits.    LYMPHADENOPATHY:  - Patient reports itching of enlarged lymph nodes, especially when sick.  - Examined lymph nodes and found them to be slightly enlarged but not concerning.  - Assessed that the enlarged lymph nodes are part of the patient's immune system response and not a cause for concern.    MEDICATIONS/SUPPLEMENTS:  - Patient to continue using turmeric tea as an anti-inflammatory.    FOLLOW UP:  - Follow up in 1 year.  - Message doctor 1-2 weeks before next appointment to request updated labs.         Plan:       Annual physical exam  Completed    Encounter for well woman exam with routine gynecological exam  Needs to schedule    Non-seasonal allergic rhinitis due to pollen  -     montelukast (SINGULAIR) 10 mg tablet; Take 1 tablet (10 mg total) by mouth every evening.  Dispense: 30 tablet; Refill: 0        Patient counseled on age appropriate medical preventative services, age appropriate cancer screenings, nutrition, healthy diet, consistent exercise regimen and maintaining an active lifestyle.      Counseled on age appropriate vaccines and discussed upcoming health care needs based on age/gender.  Spent time with patient counseling on need for a good patient/doctor relationship moving forward.  Discussed use of common OTC medications and supplements.  Discussed common dietary aids and use of caffeine and the need for good sleep hygiene and stress management.    Follow up in about 1 year (around 2/26/2026) for Annual Wellness.        This note was generated with the assistance of ambient listening technology. Verbal consent was obtained by the patient and accompanying visitor(s) for the recording of patient appointment to facilitate this note. I attest to having reviewed and edited the generated note for accuracy, though some syntax or spelling errors may persist. Please contact the author of this note  for any clarification.      2/26/2025 KOFI Ba, FNP-C                 [1]   Social History  Tobacco Use    Smoking status: Former     Current packs/day: 0.00     Types: Cigarettes     Quit date: 1/28/2022     Years since quitting: 3.0    Smokeless tobacco: Never   Substance Use Topics    Alcohol use: No    Drug use: No

## 2025-03-11 DIAGNOSIS — J45.20 MILD INTERMITTENT REACTIVE AIRWAY DISEASE WITHOUT COMPLICATION: ICD-10-CM

## 2025-03-13 RX ORDER — ALBUTEROL SULFATE 90 UG/1
INHALANT RESPIRATORY (INHALATION)
Qty: 6.7 G | Refills: 1 | Status: SHIPPED | OUTPATIENT
Start: 2025-03-13

## 2025-03-25 DIAGNOSIS — J30.1 NON-SEASONAL ALLERGIC RHINITIS DUE TO POLLEN: ICD-10-CM

## 2025-03-25 RX ORDER — MONTELUKAST SODIUM 10 MG/1
10 TABLET ORAL NIGHTLY
Qty: 30 TABLET | Refills: 0 | Status: SHIPPED | OUTPATIENT
Start: 2025-03-25

## 2025-04-23 DIAGNOSIS — J30.1 NON-SEASONAL ALLERGIC RHINITIS DUE TO POLLEN: ICD-10-CM

## 2025-04-24 RX ORDER — MONTELUKAST SODIUM 10 MG/1
10 TABLET ORAL NIGHTLY
Qty: 30 TABLET | Refills: 2 | Status: SHIPPED | OUTPATIENT
Start: 2025-04-24

## 2025-08-05 DIAGNOSIS — J30.1 NON-SEASONAL ALLERGIC RHINITIS DUE TO POLLEN: ICD-10-CM

## 2025-08-05 RX ORDER — MONTELUKAST SODIUM 10 MG/1
10 TABLET ORAL NIGHTLY
Qty: 30 TABLET | Refills: 2 | Status: SHIPPED | OUTPATIENT
Start: 2025-08-05